# Patient Record
Sex: FEMALE | Race: WHITE | NOT HISPANIC OR LATINO | Employment: OTHER | URBAN - METROPOLITAN AREA
[De-identification: names, ages, dates, MRNs, and addresses within clinical notes are randomized per-mention and may not be internally consistent; named-entity substitution may affect disease eponyms.]

---

## 2017-01-12 ENCOUNTER — HOSPITAL ENCOUNTER (OUTPATIENT)
Dept: GENERAL RADIOLOGY | Facility: HOSPITAL | Age: 67
Discharge: HOME | End: 2017-01-12
Admitting: ORTHOPAEDIC SURGERY

## 2017-01-12 ENCOUNTER — APPOINTMENT (OUTPATIENT)
Dept: PREADMISSION TESTING | Facility: HOSPITAL | Age: 67
End: 2017-01-12

## 2017-01-12 ENCOUNTER — HOSPITAL ENCOUNTER (OUTPATIENT)
Dept: GENERAL RADIOLOGY | Facility: HOSPITAL | Age: 67
Discharge: HOME | End: 2017-01-12

## 2017-01-12 VITALS
WEIGHT: 178 LBS | HEIGHT: 65 IN | RESPIRATION RATE: 16 BRPM | DIASTOLIC BLOOD PRESSURE: 63 MMHG | HEART RATE: 76 BPM | SYSTOLIC BLOOD PRESSURE: 110 MMHG | BODY MASS INDEX: 29.66 KG/M2 | TEMPERATURE: 98.2 F | OXYGEN SATURATION: 97 %

## 2017-01-12 LAB
ANION GAP SERPL CALCULATED.3IONS-SCNC: 18.5 MMOL/L
BASOPHILS # BLD AUTO: 0.04 10*3/MM3 (ref 0–0.2)
BASOPHILS NFR BLD AUTO: 0.4 % (ref 0–1.5)
BILIRUB UR QL STRIP: NEGATIVE
BUN BLD-MCNC: 34 MG/DL (ref 8–23)
BUN/CREAT SERPL: 26.6 (ref 7–25)
CALCIUM SPEC-SCNC: 9 MG/DL (ref 8.6–10.5)
CHLORIDE SERPL-SCNC: 97 MMOL/L (ref 98–107)
CLARITY UR: CLEAR
CO2 SERPL-SCNC: 23.5 MMOL/L (ref 22–29)
COLOR UR: YELLOW
CREAT BLD-MCNC: 1.28 MG/DL (ref 0.57–1)
DEPRECATED RDW RBC AUTO: 46.2 FL (ref 37–54)
EOSINOPHIL # BLD AUTO: 0.14 10*3/MM3 (ref 0–0.7)
EOSINOPHIL NFR BLD AUTO: 1.5 % (ref 0.3–6.2)
ERYTHROCYTE [DISTWIDTH] IN BLOOD BY AUTOMATED COUNT: 15 % (ref 11.7–13)
GFR SERPL CREATININE-BSD FRML MDRD: 42 ML/MIN/1.73
GLUCOSE BLD-MCNC: 252 MG/DL (ref 65–99)
GLUCOSE UR STRIP-MCNC: NEGATIVE MG/DL
HCT VFR BLD AUTO: 37.5 % (ref 35.6–45.5)
HGB BLD-MCNC: 11.5 G/DL (ref 11.9–15.5)
HGB UR QL STRIP.AUTO: NEGATIVE
IMM GRANULOCYTES # BLD: 0 10*3/MM3 (ref 0–0.03)
IMM GRANULOCYTES NFR BLD: 0 % (ref 0–0.5)
KETONES UR QL STRIP: NEGATIVE
LEUKOCYTE ESTERASE UR QL STRIP.AUTO: NEGATIVE
LYMPHOCYTES # BLD AUTO: 2.59 10*3/MM3 (ref 0.9–4.8)
LYMPHOCYTES NFR BLD AUTO: 27.8 % (ref 19.6–45.3)
MCH RBC QN AUTO: 25.7 PG (ref 26.9–32)
MCHC RBC AUTO-ENTMCNC: 30.7 G/DL (ref 32.4–36.3)
MCV RBC AUTO: 83.9 FL (ref 80.5–98.2)
MONOCYTES # BLD AUTO: 0.47 10*3/MM3 (ref 0.2–1.2)
MONOCYTES NFR BLD AUTO: 5 % (ref 5–12)
NEUTROPHILS # BLD AUTO: 6.09 10*3/MM3 (ref 1.9–8.1)
NEUTROPHILS NFR BLD AUTO: 65.3 % (ref 42.7–76)
NITRITE UR QL STRIP: NEGATIVE
PH UR STRIP.AUTO: 5.5 [PH] (ref 5–8)
PLATELET # BLD AUTO: 355 10*3/MM3 (ref 140–500)
PMV BLD AUTO: 10.4 FL (ref 6–12)
POTASSIUM BLD-SCNC: 3.9 MMOL/L (ref 3.5–5.2)
PROT UR QL STRIP: NEGATIVE
RBC # BLD AUTO: 4.47 10*6/MM3 (ref 3.9–5.2)
SODIUM BLD-SCNC: 139 MMOL/L (ref 136–145)
SP GR UR STRIP: 1.01 (ref 1–1.03)
UROBILINOGEN UR QL STRIP: NORMAL
WBC NRBC COR # BLD: 9.33 10*3/MM3 (ref 4.5–10.7)

## 2017-01-12 PROCEDURE — 81003 URINALYSIS AUTO W/O SCOPE: CPT | Performed by: ORTHOPAEDIC SURGERY

## 2017-01-12 PROCEDURE — 93010 ELECTROCARDIOGRAM REPORT: CPT | Performed by: INTERNAL MEDICINE

## 2017-01-12 PROCEDURE — 73502 X-RAY EXAM HIP UNI 2-3 VIEWS: CPT

## 2017-01-12 PROCEDURE — 80048 BASIC METABOLIC PNL TOTAL CA: CPT | Performed by: ORTHOPAEDIC SURGERY

## 2017-01-12 PROCEDURE — 71020 HC CHEST PA AND LATERAL: CPT

## 2017-01-12 PROCEDURE — 87086 URINE CULTURE/COLONY COUNT: CPT | Performed by: ORTHOPAEDIC SURGERY

## 2017-01-12 PROCEDURE — 85025 COMPLETE CBC W/AUTO DIFF WBC: CPT | Performed by: ORTHOPAEDIC SURGERY

## 2017-01-12 PROCEDURE — 93005 ELECTROCARDIOGRAM TRACING: CPT

## 2017-01-12 PROCEDURE — 36415 COLL VENOUS BLD VENIPUNCTURE: CPT

## 2017-01-12 RX ORDER — ASPIRIN 81 MG/1
81 TABLET, CHEWABLE ORAL DAILY
Status: ON HOLD | COMMUNITY
End: 2017-03-23

## 2017-01-12 RX ORDER — DOXYCYCLINE HYCLATE 100 MG/1
100 CAPSULE ORAL 2 TIMES DAILY
COMMUNITY
Start: 2017-01-06 | End: 2017-01-16

## 2017-01-12 RX ORDER — MAGNESIUM OXIDE 400 MG/1
400 TABLET ORAL DAILY
COMMUNITY

## 2017-01-12 RX ORDER — METOPROLOL SUCCINATE 50 MG/1
50 TABLET, EXTENDED RELEASE ORAL EVERY MORNING
COMMUNITY

## 2017-01-12 RX ORDER — ACETAMINOPHEN 500 MG
1000 TABLET ORAL EVERY 6 HOURS PRN
COMMUNITY

## 2017-01-12 NOTE — DISCHARGE INSTRUCTIONS
Take the following medications the morning of surgery with a small sip of water.     METOPROLOL    General Instructions:  • Do not eat or drink after midnight: includes water, mints, or gum. You may brush your teeth.  • Do not smoke, chew tobacco, or drink alcohol.  • Bring medications in original bottles, any inhalers and if applicable your C-PAP/ BI-PAP machine.  • Bring any papers given to you in the doctor’s office.  • Wear clean comfortable clothes and socks.  • Do not wear contact lenses or make-up.  Bring a case for your glasses if applicable.   • Bring crutches or walker if applicable.  • Leave all other valuables and jewelry at home.      Preventing a Surgical Site Infection:  Shower on the morning of surgery using a fresh bar of anti-bacterial soap (such as Dial) and clean washcloth.  Dry with a clean towel and dress in clean clothing.  For 2 to 3 days before surgery, avoid shaving with a razor near where you will have surgery because the razor can irritate skin and make it easier to develop an infection  Ask your surgeon if you will be receiving antibiotics prior to surgery  Make sure you, your family, and all healthcare providers clean their hands with soap and water or an alcohol based hand  before caring for you or your wound  If at all possible, quit smoking as many days before surgery as you can.    Day of surgery:01- ARRIVE @ 0700 AM REPORT TO THE MAIN OR   Upon arrival, a Pre-op nurse and Anesthesiologist will review your health history, obtain vital signs, and answer questions you may have.  The only belongings needed at this time will be your home medications and if applicable your C-PAP/BI-PAP machine.  If you are staying overnight your family can leave the rest of your belongings in the car and bring them to your room later.  A Pre-op nurse will start an IV and you may receive medication in preparation for surgery, including something to help you relax.  Your family will be able  to see you in the Pre-op area.  While you are in surgery your family should notify the waiting room  if they leave the waiting room area and provide a contact phone number.    Please be aware that surgery does come with discomfort.  We want to make every effort to control your discomfort so please discuss any uncontrolled symptoms with your nurse.   Your doctor will most likely have prescribed pain medications.      If you are going home after surgery you will receive individualized written care instructions before being discharged.  A responsible adult must drive you to and from the hospital on the day of your surgery and stay with you for 24 hours.    If you are staying overnight following surgery, you will be transported to your hospital room following the recovery period.  [facility] has all private rooms.    If you have any questions please call Pre-Admission Testing at [phone].  Deductibles and co-payments are collected on the day of service. Please be prepared to pay the required co-pay, deductible or deposit on the day of service as defined by your plan.    2% CHLORAHEXIDINE GLUCONATE* CLOTH  Preparing or “prepping” skin before surgery can reduce the risk of infection at the surgical site. To make the process easier, [facility] has chosen disposable cloths moistened with a rinse-free, 2% Chlorhexidine Gluconate (CHG) antiseptic solution. The steps below outline the prepping process and should be carefully followed.        Use the prep cloth on the area that is circled in the diagram             Directions Night before Surgery 01- PM PRIOR TO SURGERY (RIGHT HIP AREA)  1) Shower using a fresh bar of anti-bacterial soap (such as Dial) and clean washcloth.  Use a clean towel to completely dry your skin.  2) Do not use any lotions, oils or creams on your skin.  3) Open the package and remove 1 cloth, wipe your skin for 30 seconds in a circular motion.  Allow to  dry for 3 minutes.  4) Repeat #3 with second cloth.  5) Do not touch your eyes, ears, or mouth with the prep cloth.  6) Allow the wet prep solution to air dry.  7) Discard the prep cloth and wash your hands with soap and water.   8) Dress in clean bed clothes and sleep on fresh clean bed sheets.   9) You may experience some temporary itching after the prep.    Directions Day of Surgery 01- AM PRIOR TO SURGERY (RIGHT HIP AREA)  1) Repeat steps 1,2,3,4,5,6,7, and 9.   2) Dress in clean clothes before coming to the hospital.    BACTROBAN NASAL OINTMENT  There are many germs normally in your nose. Bactroban is an ointment that will help reduce these germs. Please follow these instructions for Bactroban use:    ____The day before surgery in the morning  [id number]______    ____The day before surgery in the evening              [id number]_______-    ____The day of surgery in the morning    [id number]_______    **Squirt ½ package of Bactroban Ointment onto a cotton applicator and apply to inside of 1st nostril.  Squirt the remaining Bactroban and apply to the inside of the other nostril.

## 2017-01-12 NOTE — MR AVS SNAPSHOT
"                        Monie Mccoy   1/12/2017 2:00 PM   Appointment    Provider:  BUTCH Calvillo   Department:  [facility] PREADMISSION T   Dept Phone:  [phone]                Your Full Care Plan              Your Updated Medication List          This list is accurate as of: 1/12/17  2:42 PM.  Always use your most recent med list.                acetaminophen 500 MG tablet   Commonly known as:  TYLENOL       aspirin 81 MG chewable tablet       CHLORHEXIDINE GLUCONATE CLOTH EX       diclofenac 75 MG EC tablet   Commonly known as:  VOLTAREN       doxycycline 100 MG capsule   Commonly known as:  VIBRAMYCIN       hydrochlorothiazide 25 MG tablet   Commonly known as:  HYDRODIURIL       * LEVEMIR 100 UNIT/ML injection   Generic drug:  insulin detemir       * LEVEMIR 100 UNIT/ML injection   Generic drug:  insulin detemir       levocetirizine 5 MG tablet   Commonly known as:  XYZAL       lisinopril 40 MG tablet   Commonly known as:  PRINIVIL,ZESTRIL       magnesium oxide 400 MG tablet   Commonly known as:  MAG-OX       metFORMIN 500 MG tablet   Commonly known as:  GLUCOPHAGE       metoprolol succinate XL 50 MG 24 hr tablet   Commonly known as:  TOPROL-XL       montelukast 10 MG tablet   Commonly known as:  SINGULAIR       mupirocin 2 % nasal ointment   Commonly known as:  BACTROBAN       potassium chloride 10 MEQ CR tablet   Commonly known as:  K-DUR       RELION INSULIN SYR 0.5CC/29G 29G X 1/2\" 0.5 ML misc   Generic drug:  Insulin Syringe       sertraline 100 MG tablet   Commonly known as:  ZOLOFT       simvastatin 40 MG tablet   Commonly known as:  ZOCOR       traMADol 50 MG tablet   Commonly known as:  ULTRAM   Take 1 tablet by mouth At Night As Needed for severe pain (7-10).       * Notice:  This list has 2 medication(s) that are the same as other medications prescribed for you. Read the directions carefully, and ask your doctor or other care provider to review them with you.            Geovanna " "Signup     [facility] [employer] allows you to send messages to your doctor, view your test results, renew your prescriptions, schedule appointments, and more. To sign up, go to [employer] and click on the Sign Up Now link in the New User? box. Enter your [employer] Activation Code exactly as it appears below along with the last four digits of your Social Security Number and your Date of Birth () to complete the sign-up process. If you do not sign up before the expiration date, you must request a new code.    [employer] Activation Code: [email or web address]  Expires: 2017  2:42 PM    If you have questions, you can email [email or web address] or call [phone] to talk to our [employer] staff. Remember, [employer] is NOT to be used for urgent needs. For medical emergencies, dial 911.               Other Info from Your Visit           Allergies     Amoxicillin Itching, Rash    Furadantin [Nitrofurantoin] Hives, Itching, Rash      Vital Signs     Blood Pressure Pulse Temperature Respirations Height Weight    110/63 (BP Location: Left arm, Patient Position: Sitting) 76 98.2 °F (36.8 °C) (Oral) 16 64.5\" (163.8 cm) 178 lb (80.7 kg)    Oxygen Saturation Body Mass Index Smoking Status             97% 30.08 kg/m2 Former Smoker           Discharge Instructions       Take the following medications the morning of surgery with a small sip of water.     METOPROLOL    General Instructions:  • Do not eat or drink after midnight: includes water, mints, or gum. You may brush your teeth.  • Do not smoke, chew tobacco, or drink alcohol.  • Bring medications in original bottles, any inhalers and if applicable your C-PAP/ BI-PAP machine.  • Bring any papers given to you in the doctor’s office.  • Wear clean comfortable clothes and socks.  • Do not wear contact lenses or make-up.  Bring a case for your glasses if applicable.   • Bring crutches or walker if applicable.  • Leave all other valuables and jewelry at " home.      Preventing a Surgical Site Infection:  Shower on the morning of surgery using a fresh bar of anti-bacterial soap (such as Dial) and clean washcloth.  Dry with a clean towel and dress in clean clothing.  For 2 to 3 days before surgery, avoid shaving with a razor near where you will have surgery because the razor can irritate skin and make it easier to develop an infection  Ask your surgeon if you will be receiving antibiotics prior to surgery  Make sure you, your family, and all healthcare providers clean their hands with soap and water or an alcohol based hand  before caring for you or your wound  If at all possible, quit smoking as many days before surgery as you can.    Day of surgery:01- ARRIVE @ 0700 AM REPORT TO THE MAIN OR   Upon arrival, a Pre-op nurse and Anesthesiologist will review your health history, obtain vital signs, and answer questions you may have.  The only belongings needed at this time will be your home medications and if applicable your C-PAP/BI-PAP machine.  If you are staying overnight your family can leave the rest of your belongings in the car and bring them to your room later.  A Pre-op nurse will start an IV and you may receive medication in preparation for surgery, including something to help you relax.  Your family will be able to see you in the Pre-op area.  While you are in surgery your family should notify the waiting room  if they leave the waiting room area and provide a contact phone number.    Please be aware that surgery does come with discomfort.  We want to make every effort to control your discomfort so please discuss any uncontrolled symptoms with your nurse.   Your doctor will most likely have prescribed pain medications.      If you are going home after surgery you will receive individualized written care instructions before being discharged.  A responsible adult must drive you to and from the hospital on the day of your surgery and  stay with you for 24 hours.    If you are staying overnight following surgery, you will be transported to your hospital room following the recovery period.  [facility] has all private rooms.    If you have any questions please call Pre-Admission Testing at [phone].  Deductibles and co-payments are collected on the day of service. Please be prepared to pay the required co-pay, deductible or deposit on the day of service as defined by your plan.    2% CHLORAHEXIDINE GLUCONATE* CLOTH  Preparing or “prepping” skin before surgery can reduce the risk of infection at the surgical site. To make the process easier, [facility] has chosen disposable cloths moistened with a rinse-free, 2% Chlorhexidine Gluconate (CHG) antiseptic solution. The steps below outline the prepping process and should be carefully followed.        Use the prep cloth on the area that is circled in the diagram             Directions Night before Surgery 01- PM PRIOR TO SURGERY (RIGHT HIP AREA)  1) Shower using a fresh bar of anti-bacterial soap (such as Dial) and clean washcloth.  Use a clean towel to completely dry your skin.  2) Do not use any lotions, oils or creams on your skin.  3) Open the package and remove 1 cloth, wipe your skin for 30 seconds in a circular motion.  Allow to dry for 3 minutes.  4) Repeat #3 with second cloth.  5) Do not touch your eyes, ears, or mouth with the prep cloth.  6) Allow the wet prep solution to air dry.  7) Discard the prep cloth and wash your hands with soap and water.   8) Dress in clean bed clothes and sleep on fresh clean bed sheets.   9) You may experience some temporary itching after the prep.    Directions Day of Surgery 01- AM PRIOR TO SURGERY (RIGHT HIP AREA)  1) Repeat steps 1,2,3,4,5,6,7, and 9.   2) Dress in clean clothes before coming to the hospital.    BACTROBAN NASAL OINTMENT  There are many germs normally in your nose. Bactroban is an ointment that will  help reduce these germs. Please follow these instructions for Bactroban use:    ____The day before surgery in the morning  [id number]______    ____The day before surgery in the evening              [id number]_______-    ____The day of surgery in the morning    [id number]_______    **Squirt ½ package of Bactroban Ointment onto a cotton applicator and apply to inside of 1st nostril.  Squirt the remaining Bactroban and apply to the inside of the other nostril.         SYMPTOMS OF A STROKE    Call 911 or have someone take you to the Emergency Department if you have any of the following:    · Sudden numbness or weakness of your face, arm or leg especially on one side of the body  · Sudden confusion, diffiiculty speaking or trouble understanding   · Changes in your vision or loss of sight in one eye  · Sudden severe headache with no known cause  · sudden dizziness, trouble walking, loss of balance or coordination    It is important to seek emergency care right away if you have further stroke symptoms. If you get emergency help quickly, the powerful clot-dissolving medicines can reduce the disabilities caused by a stroke.     For more information:    American Stroke Association  7-002-8-STROKE  www.strokeassociation.org           IF YOU SMOKE OR USE TOBACCO PLEASE READ THE FOLLOWING:    Why is smoking bad for me?  Smoking increases the risk of heart disease, lung disease, vascular disease, stroke, and cancer.     If you smoke, STOP!    If you would like more information on quitting smoking, please visit the [employer] website: [email or web address]   This link will provide additional resources including the QUIT line and the Beat the Pack support groups.     For more information:    American Cancer Society  ([phone] 380-9591    American Heart Association  1-316.161.7140

## 2017-01-14 LAB — BACTERIA SPEC AEROBE CULT: NORMAL

## 2017-01-23 ENCOUNTER — TELEPHONE (OUTPATIENT)
Dept: ORTHOPEDIC SURGERY | Facility: CLINIC | Age: 67
End: 2017-01-23

## 2017-01-23 ENCOUNTER — PREP FOR SURGERY (OUTPATIENT)
Dept: ORTHOPEDIC SURGERY | Facility: CLINIC | Age: 67
End: 2017-01-23

## 2017-01-23 DIAGNOSIS — M16.11 OSTEOARTHRITIS OF ONE HIP, RIGHT: Primary | ICD-10-CM

## 2017-03-08 ENCOUNTER — TELEPHONE (OUTPATIENT)
Dept: ORTHOPEDIC SURGERY | Facility: CLINIC | Age: 67
End: 2017-03-08

## 2017-03-08 NOTE — PROGRESS NOTES
Pre-Operative Orthopedic Assessment      Patient: Monie Mccoy    YOB: 1950      Age/Gender: 67 y.o. female  Medical Record Number: [id number]  Surgical Procedure Planned: TOTAL HIP ARTHROPLASTY ANTERIOR WITH HANA TABLE     Surgeon: Lalit Birch MD    Date of Surgery Planned: 03/21/2017    Question Value Score    Patient Score   1. What is your age group? 50-65 years  66-75 years  >75 years =2  =1  =0 1   2. Gender? Male  Female =2  =1 1   3. How far on average can you walk? (a block is 200 meters) Two blocks or more (+\- rest)  1-2 blocks (+\- rest)  Housebound (most of time) =2  =1  =0 0   4. Which gait aid to you use? (more often than not) None  Single-Point Stick  Crutches/Frame =2  =1  =0 0   5. Do you use community  supports? (home help, meals on wheels, district nursing) None or one per week  Two or more per week =1  =0 1   6. Will you live with someone who can care for you after your operation? Yes  No =3  =0 3      Your Score (out of 12)  6     Key Destination at Discharge from acute care predicted by score   Scores < 6  -- extended inpatient rehabilitation   Scores 6-9 -- additional intervention to discharge directly home (Rehabilitation in home)   Scores > 9 -- directly home         Discharge Disposition/Planning:     Patient Response   Discussed the Predicted discharge disposition needed based on RAPT Assessment with the patient.    yes   Patient selected discharge disposition:   home   Out Patient Rehabilitation Facility of Choice:    none   [facility]   Has the patient completed or been scheduled to complete pre-operative testing? yes   Post-Operative Care Giver Name and Phone Number:    Spouse Gareth  [phone]  Daughter  [phone]     Subacute Inpatient Rehabilitation:  Complete this section only if planning inpatient services at a Subacute Facility     Patient Response   Subacute Facility Preferred (Please list 2  facilities:      Requires pre-certification for inpatient rehabilitation services?         Planned source of transportation to inpatient rehabilitation facility?       If choosing inpatient services at an Acute or Subacute Facility please list a subsequent back-up plan (in case patient fails to qualify for inpatient rehabilitation). Back-up plans should include caregiver (family member or friend) for first 24-48 post- -operatively.       Home Equipment Patient Response   Does patient have a walker for home use?    yes   Does patient have a 3 in 1 commode for home use?    no   Does patient have a shower chair for home use?    no   Does patient have an elevated commode seat for home use? Handicapped commode   Does patient have a cane for home use?    yes   Is there any other medical equipment in the home? If so,  List in comment section below no   Pre-Operative Class Attendance Patient Response   Attended or scheduled to attend the pre-operative class within 1 year of total joint replacement? no   Not planning to attend the pre-operative class    no   Has attended the pre-operative class > 1 year ago    States she has been in the past but last knee replacement was at [facility]     Does not want to attend the class    no   Was misinformed that did not need to attend the class    Was not aware   Class time is not convenient    n/a   Other reasons for refusing to attend the pre-operative class (if yes, see comments below)   n/a   Patient Education  Completed   Expected time of discharge discussed yes   Encouraged to attend Pre-Operative Class    yes   Education re: Back-up plan for patients planning discharge to subacute facilities n/a   Education re: Predicted Discharge Disposition based on RAPT score    yes   Patient receptive and voiced understanding of information given    yes                                                                                                            Comments:    Patient resides in a  single story home with no steps.  Daughter resides nearby.  Patient was under the impression that she would stay several days at [facility] for rehab like her knee surgery at [facility] followed by skilled care at [facility].  Explained that [facility] does not have skilled care and we could assist with placement at subacute level of care or try for bed at [facility] and patient states that she wants to go home with Raquel PEÑA.                                       Signature: Miguelina Tejada RN    Date:  3/8/2017

## 2017-03-14 ENCOUNTER — APPOINTMENT (OUTPATIENT)
Dept: PREADMISSION TESTING | Facility: HOSPITAL | Age: 67
End: 2017-03-14

## 2017-03-14 VITALS
WEIGHT: 181 LBS | DIASTOLIC BLOOD PRESSURE: 70 MMHG | RESPIRATION RATE: 20 BRPM | SYSTOLIC BLOOD PRESSURE: 147 MMHG | OXYGEN SATURATION: 99 % | HEIGHT: 66 IN | BODY MASS INDEX: 29.09 KG/M2 | HEART RATE: 68 BPM | TEMPERATURE: 98.2 F

## 2017-03-14 LAB
ANION GAP SERPL CALCULATED.3IONS-SCNC: 13.2 MMOL/L
BACTERIA UR QL AUTO: NORMAL /HPF
BASOPHILS # BLD AUTO: 0.02 10*3/MM3 (ref 0–0.2)
BASOPHILS NFR BLD AUTO: 0.2 % (ref 0–1.5)
BILIRUB UR QL STRIP: NEGATIVE
BUN BLD-MCNC: 31 MG/DL (ref 8–23)
BUN/CREAT SERPL: 24.8 (ref 7–25)
CALCIUM SPEC-SCNC: 9.2 MG/DL (ref 8.6–10.5)
CHLORIDE SERPL-SCNC: 100 MMOL/L (ref 98–107)
CLARITY UR: CLEAR
CO2 SERPL-SCNC: 25.8 MMOL/L (ref 22–29)
COLOR UR: YELLOW
CREAT BLD-MCNC: 1.25 MG/DL (ref 0.57–1)
DEPRECATED RDW RBC AUTO: 46.9 FL (ref 37–54)
EOSINOPHIL # BLD AUTO: 0.1 10*3/MM3 (ref 0–0.7)
EOSINOPHIL NFR BLD AUTO: 1.1 % (ref 0.3–6.2)
ERYTHROCYTE [DISTWIDTH] IN BLOOD BY AUTOMATED COUNT: 15.6 % (ref 11.7–13)
GFR SERPL CREATININE-BSD FRML MDRD: 43 ML/MIN/1.73
GLUCOSE BLD-MCNC: 137 MG/DL (ref 65–99)
GLUCOSE UR STRIP-MCNC: NEGATIVE MG/DL
HCT VFR BLD AUTO: 36.9 % (ref 35.6–45.5)
HGB BLD-MCNC: 11.3 G/DL (ref 11.9–15.5)
HGB UR QL STRIP.AUTO: NEGATIVE
HYALINE CASTS UR QL AUTO: NORMAL /LPF
IMM GRANULOCYTES # BLD: 0.02 10*3/MM3 (ref 0–0.03)
IMM GRANULOCYTES NFR BLD: 0.2 % (ref 0–0.5)
KETONES UR QL STRIP: NEGATIVE
LEUKOCYTE ESTERASE UR QL STRIP.AUTO: NEGATIVE
LYMPHOCYTES # BLD AUTO: 3.14 10*3/MM3 (ref 0.9–4.8)
LYMPHOCYTES NFR BLD AUTO: 34 % (ref 19.6–45.3)
MCH RBC QN AUTO: 25.2 PG (ref 26.9–32)
MCHC RBC AUTO-ENTMCNC: 30.6 G/DL (ref 32.4–36.3)
MCV RBC AUTO: 82.2 FL (ref 80.5–98.2)
MONOCYTES # BLD AUTO: 0.37 10*3/MM3 (ref 0.2–1.2)
MONOCYTES NFR BLD AUTO: 4 % (ref 5–12)
NEUTROPHILS # BLD AUTO: 5.59 10*3/MM3 (ref 1.9–8.1)
NEUTROPHILS NFR BLD AUTO: 60.5 % (ref 42.7–76)
NITRITE UR QL STRIP: NEGATIVE
PH UR STRIP.AUTO: 6 [PH] (ref 5–8)
PLATELET # BLD AUTO: 310 10*3/MM3 (ref 140–500)
PMV BLD AUTO: 9.9 FL (ref 6–12)
POTASSIUM BLD-SCNC: 4.1 MMOL/L (ref 3.5–5.2)
PROT UR QL STRIP: NEGATIVE
RBC # BLD AUTO: 4.49 10*6/MM3 (ref 3.9–5.2)
RBC # UR: NORMAL /HPF
REF LAB TEST METHOD: NORMAL
SODIUM BLD-SCNC: 139 MMOL/L (ref 136–145)
SP GR UR STRIP: 1.01 (ref 1–1.03)
SQUAMOUS #/AREA URNS HPF: NORMAL /HPF
UROBILINOGEN UR QL STRIP: NORMAL
WBC NRBC COR # BLD: 9.24 10*3/MM3 (ref 4.5–10.7)
WBC UR QL AUTO: NORMAL /HPF

## 2017-03-14 PROCEDURE — 36415 COLL VENOUS BLD VENIPUNCTURE: CPT

## 2017-03-14 PROCEDURE — A9270 NON-COVERED ITEM OR SERVICE: HCPCS | Performed by: ORTHOPAEDIC SURGERY

## 2017-03-14 PROCEDURE — 87086 URINE CULTURE/COLONY COUNT: CPT | Performed by: ORTHOPAEDIC SURGERY

## 2017-03-14 PROCEDURE — 80048 BASIC METABOLIC PNL TOTAL CA: CPT | Performed by: ORTHOPAEDIC SURGERY

## 2017-03-14 PROCEDURE — 85025 COMPLETE CBC W/AUTO DIFF WBC: CPT | Performed by: ORTHOPAEDIC SURGERY

## 2017-03-14 PROCEDURE — 63710000001 MUPIROCIN 2 % OINTMENT 0.9 G SYRINGE: Performed by: ORTHOPAEDIC SURGERY

## 2017-03-14 PROCEDURE — 81001 URINALYSIS AUTO W/SCOPE: CPT | Performed by: ORTHOPAEDIC SURGERY

## 2017-03-14 NOTE — DISCHARGE INSTRUCTIONS
Take the following medications the morning of surgery with a small sip of water.  METOPROLOL  BRING LISINOPRIL WITH YOU AM OF SURGERY      General Instructions:  • Do not eat or drink after midnight: includes water, mints, or gum. You may brush your teeth.  • Do not smoke, chew tobacco, or drink alcohol.  • The Pre-Admission Testing nurse will instruct you to bring medications if unable to obtain an accurate list in Pre-Admission Testing.    • If applicable bring your C-PAP/ BI-PAP machine.  • Bring any papers given to you in the doctor’s office.  • Wear clean comfortable clothes and socks.  • Do not wear contact lenses or make-up.  Bring a case for your glasses if applicable.   • Bring crutches or walker if applicable.  • Leave all other valuables and jewelry at home.    If you were given a blood bank ID arm band remember to bring it with you the day of surgery.    Preventing a Surgical Site Infection:  Shower on the morning of surgery using a fresh bar of anti-bacterial soap (such as Dial) and clean washcloth.  Dry with a clean towel and dress in clean clothing.  For 2 to 3 days before surgery, avoid shaving with a razor near where you will have surgery because the razor can irritate skin and make it easier to develop an infection  Ask your surgeon if you will be receiving antibiotics prior to surgery  Make sure you, your family, and all healthcare providers clean their hands with soap and water or an alcohol based hand  before caring for you or your wound  If at all possible, quit smoking as many days before surgery as you can.    Day of surgery: 3/21/2017 ARRIVAL TIME 7:00 AM  Upon arrival, a Pre-op nurse and Anesthesiologist will review your health history, obtain vital signs, and answer questions you may have.  The only belongings needed at this time will be your home medications and if applicable your C-PAP/BI-PAP machine.  If you are staying overnight your family can leave the rest of your  belongings in the car and bring them to your room later.  A Pre-op nurse will start an IV and you may receive medication in preparation for surgery, including something to help you relax.  Your family will be able to see you in the Pre-op area.  While you are in surgery your family should notify the waiting room  if they leave the waiting room area and provide a contact phone number.    Please be aware that surgery does come with discomfort.  We want to make every effort to control your discomfort so please discuss any uncontrolled symptoms with your nurse.   Your doctor will most likely have prescribed pain medications.      If you are going home after surgery you will receive individualized written care instructions before being discharged.  A responsible adult must drive you to and from the hospital on the day of your surgery and stay with you for 24 hours.    If you are staying overnight following surgery, you will be transported to your hospital room following the recovery period.  [facility] has all private rooms.    If you have any questions please call Pre-Admission Testing at [phone].  Deductibles and co-payments are collected on the day of service. Please be prepared to pay the required co-pay, deductible or deposit on the day of service as defined by your plan.    2% CHLORAHEXIDINE GLUCONATE* CLOTH  Preparing or “prepping” skin before surgery can reduce the risk of infection at the surgical site. To make the process easier, [facility] has chosen disposable cloths moistened with a rinse-free, 2% Chlorhexidine Gluconate (CHG) antiseptic solution. The steps below outline the prepping process and should be carefully followed.        Use the prep cloth on the area that is circled in the diagram             Directions Night before Surgery  1) Shower using a fresh bar of anti-bacterial soap (such as Dial) and clean washcloth.  Use a clean towel to completely dry your  skin.  2) Do not use any lotions, oils or creams on your skin.  3) Open the package and remove 1 cloth, wipe your skin for 30 seconds in a circular motion.  Allow to dry for 3 minutes.  4) Repeat #3 with second cloth.  5) Do not touch your eyes, ears, or mouth with the prep cloth.  6) Allow the wet prep solution to air dry.  7) Discard the prep cloth and wash your hands with soap and water.   8) Dress in clean bed clothes and sleep on fresh clean bed sheets.   9) You may experience some temporary itching after the prep.    Directions Day of Surgery  1) Repeat steps 1,2,3,4,5,6,7, and 9.   2) Dress in clean clothes before coming to the hospital.    BACTROBAN NASAL OINTMENT  There are many germs normally in your nose. Bactroban is an ointment that will help reduce these germs. Please follow these instructions for Bactroban use:        ____The day before surgery in the morning  Date________    ____The day before surgery in the evening              Date________    ____The day of surgery in the morning    Date________    **Squirt ½ package of Bactroban Ointment onto a cotton applicator and apply to inside of 1st nostril.  Squirt the remaining Bactroban and apply to the inside of the other nostril.

## 2017-03-16 LAB — BACTERIA SPEC AEROBE CULT: NO GROWTH

## 2017-03-21 ENCOUNTER — HOSPITAL ENCOUNTER (INPATIENT)
Facility: HOSPITAL | Age: 67
LOS: 2 days | Discharge: HOME HEALTH | End: 2017-03-23
Attending: ORTHOPAEDIC SURGERY | Admitting: ORTHOPAEDIC SURGERY

## 2017-03-21 ENCOUNTER — ANESTHESIA (OUTPATIENT)
Dept: PERIOP | Facility: HOSPITAL | Age: 67
End: 2017-03-21

## 2017-03-21 ENCOUNTER — APPOINTMENT (OUTPATIENT)
Dept: GENERAL RADIOLOGY | Facility: HOSPITAL | Age: 67
End: 2017-03-21

## 2017-03-21 ENCOUNTER — ANESTHESIA EVENT (OUTPATIENT)
Dept: PERIOP | Facility: HOSPITAL | Age: 67
End: 2017-03-21

## 2017-03-21 DIAGNOSIS — M16.11 OSTEOARTHRITIS OF ONE HIP, RIGHT: ICD-10-CM

## 2017-03-21 DIAGNOSIS — M16.11 PRIMARY OSTEOARTHRITIS OF RIGHT HIP: Primary | ICD-10-CM

## 2017-03-21 LAB
GLUCOSE BLDC GLUCOMTR-MCNC: 112 MG/DL (ref 70–130)
GLUCOSE BLDC GLUCOMTR-MCNC: 139 MG/DL (ref 70–130)
GLUCOSE BLDC GLUCOMTR-MCNC: 268 MG/DL (ref 70–130)
GLUCOSE BLDC GLUCOMTR-MCNC: 46 MG/DL (ref 70–130)
GLUCOSE BLDC GLUCOMTR-MCNC: 87 MG/DL (ref 70–130)
INR PPP: 1.11 (ref 0.9–1.1)
PROTHROMBIN TIME: 13.9 SECONDS (ref 11.7–14.2)

## 2017-03-21 PROCEDURE — 25010000002 FENTANYL CITRATE (PF) 100 MCG/2ML SOLUTION: Performed by: ANESTHESIOLOGY

## 2017-03-21 PROCEDURE — 99221 1ST HOSP IP/OBS SF/LOW 40: CPT | Performed by: INTERNAL MEDICINE

## 2017-03-21 PROCEDURE — 25010000002 HYDROMORPHONE PER 4 MG: Performed by: ORTHOPAEDIC SURGERY

## 2017-03-21 PROCEDURE — 63710000001 INSULIN DETEMER PER 5 UNITS: Performed by: ORTHOPAEDIC SURGERY

## 2017-03-21 PROCEDURE — 63710000001 INSULIN ASPART PER 5 UNITS: Performed by: INTERNAL MEDICINE

## 2017-03-21 PROCEDURE — 97110 THERAPEUTIC EXERCISES: CPT

## 2017-03-21 PROCEDURE — 25010000002 PROPOFOL 10 MG/ML EMULSION: Performed by: ANESTHESIOLOGY

## 2017-03-21 PROCEDURE — 97161 PT EVAL LOW COMPLEX 20 MIN: CPT

## 2017-03-21 PROCEDURE — 85610 PROTHROMBIN TIME: CPT | Performed by: ORTHOPAEDIC SURGERY

## 2017-03-21 PROCEDURE — 25010000002 ROPIVACAINE PER 1 MG: Performed by: ORTHOPAEDIC SURGERY

## 2017-03-21 PROCEDURE — 27130 TOTAL HIP ARTHROPLASTY: CPT | Performed by: ORTHOPAEDIC SURGERY

## 2017-03-21 PROCEDURE — 25010000002 ONDANSETRON PER 1 MG: Performed by: NURSE ANESTHETIST, CERTIFIED REGISTERED

## 2017-03-21 PROCEDURE — C1776 JOINT DEVICE (IMPLANTABLE): HCPCS | Performed by: ORTHOPAEDIC SURGERY

## 2017-03-21 PROCEDURE — 25010000002 MIDAZOLAM PER 1 MG: Performed by: ANESTHESIOLOGY

## 2017-03-21 PROCEDURE — S0260 H&P FOR SURGERY: HCPCS | Performed by: ORTHOPAEDIC SURGERY

## 2017-03-21 PROCEDURE — 25010000002 NEOSTIGMINE 5 MG/10ML SOLUTION: Performed by: NURSE ANESTHETIST, CERTIFIED REGISTERED

## 2017-03-21 PROCEDURE — 25010000002 FENTANYL CITRATE (PF) 100 MCG/2ML SOLUTION

## 2017-03-21 PROCEDURE — C1713 ANCHOR/SCREW BN/BN,TIS/BN: HCPCS | Performed by: ORTHOPAEDIC SURGERY

## 2017-03-21 PROCEDURE — 25010000002 HYDROMORPHONE PER 4 MG

## 2017-03-21 PROCEDURE — 82962 GLUCOSE BLOOD TEST: CPT

## 2017-03-21 PROCEDURE — 94799 UNLISTED PULMONARY SVC/PX: CPT

## 2017-03-21 PROCEDURE — 76000 FLUOROSCOPY <1 HR PHYS/QHP: CPT

## 2017-03-21 PROCEDURE — 73501 X-RAY EXAM HIP UNI 1 VIEW: CPT

## 2017-03-21 PROCEDURE — 72170 X-RAY EXAM OF PELVIS: CPT

## 2017-03-21 PROCEDURE — 0SR904Z REPLACEMENT OF RIGHT HIP JOINT WITH CERAMIC ON POLYETHYLENE SYNTHETIC SUBSTITUTE, OPEN APPROACH: ICD-10-PCS | Performed by: ORTHOPAEDIC SURGERY

## 2017-03-21 DEVICE — TOTL HIP VE ZIMMER: Type: IMPLANTABLE DEVICE | Site: HIP | Status: FUNCTIONAL

## 2017-03-21 DEVICE — SCRW ACET CORT TRILOGY S/TAP 6.5X25: Type: IMPLANTABLE DEVICE | Site: HIP | Status: FUNCTIONAL

## 2017-03-21 DEVICE — IMPLANTABLE DEVICE: Type: IMPLANTABLE DEVICE | Site: HIP | Status: FUNCTIONAL

## 2017-03-21 DEVICE — BIOLOX® DELTA, CERAMIC FEMORAL HEAD, S, Ø 32/-3.5, TAPER 12/14
Type: IMPLANTABLE DEVICE | Site: HIP | Status: FUNCTIONAL
Brand: BIOLOX® DELTA

## 2017-03-21 RX ORDER — POTASSIUM CHLORIDE 750 MG/1
10 CAPSULE, EXTENDED RELEASE ORAL 2 TIMES DAILY WITH MEALS
Status: DISCONTINUED | OUTPATIENT
Start: 2017-03-21 | End: 2017-03-23 | Stop reason: HOSPADM

## 2017-03-21 RX ORDER — PROMETHAZINE HYDROCHLORIDE 25 MG/1
25 TABLET ORAL ONCE AS NEEDED
Status: DISCONTINUED | OUTPATIENT
Start: 2017-03-21 | End: 2017-03-21 | Stop reason: HOSPADM

## 2017-03-21 RX ORDER — SODIUM CHLORIDE 0.9 % (FLUSH) 0.9 %
1-10 SYRINGE (ML) INJECTION AS NEEDED
Status: DISCONTINUED | OUTPATIENT
Start: 2017-03-21 | End: 2017-03-23 | Stop reason: HOSPADM

## 2017-03-21 RX ORDER — SODIUM CHLORIDE, SODIUM LACTATE, POTASSIUM CHLORIDE, CALCIUM CHLORIDE 600; 310; 30; 20 MG/100ML; MG/100ML; MG/100ML; MG/100ML
9 INJECTION, SOLUTION INTRAVENOUS CONTINUOUS
Status: DISCONTINUED | OUTPATIENT
Start: 2017-03-21 | End: 2017-03-23 | Stop reason: HOSPADM

## 2017-03-21 RX ORDER — PROMETHAZINE HYDROCHLORIDE 25 MG/ML
12.5 INJECTION, SOLUTION INTRAMUSCULAR; INTRAVENOUS ONCE AS NEEDED
Status: DISCONTINUED | OUTPATIENT
Start: 2017-03-21 | End: 2017-03-21 | Stop reason: HOSPADM

## 2017-03-21 RX ORDER — OXYCODONE HYDROCHLORIDE AND ACETAMINOPHEN 5; 325 MG/1; MG/1
2 TABLET ORAL EVERY 4 HOURS PRN
Status: DISCONTINUED | OUTPATIENT
Start: 2017-03-21 | End: 2017-03-22

## 2017-03-21 RX ORDER — ONDANSETRON 4 MG/1
4 TABLET, FILM COATED ORAL EVERY 6 HOURS PRN
Status: DISCONTINUED | OUTPATIENT
Start: 2017-03-21 | End: 2017-03-23 | Stop reason: HOSPADM

## 2017-03-21 RX ORDER — ROPIVACAINE HYDROCHLORIDE 5 MG/ML
INJECTION, SOLUTION EPIDURAL; INFILTRATION; PERINEURAL AS NEEDED
Status: DISCONTINUED | OUTPATIENT
Start: 2017-03-21 | End: 2017-03-21 | Stop reason: HOSPADM

## 2017-03-21 RX ORDER — PROMETHAZINE HYDROCHLORIDE 25 MG/1
12.5 TABLET ORAL ONCE AS NEEDED
Status: DISCONTINUED | OUTPATIENT
Start: 2017-03-21 | End: 2017-03-21 | Stop reason: HOSPADM

## 2017-03-21 RX ORDER — DIPHENHYDRAMINE HYDROCHLORIDE 50 MG/ML
12.5 INJECTION INTRAMUSCULAR; INTRAVENOUS
Status: DISCONTINUED | OUTPATIENT
Start: 2017-03-21 | End: 2017-03-21 | Stop reason: HOSPADM

## 2017-03-21 RX ORDER — NICOTINE POLACRILEX 4 MG
15 LOZENGE BUCCAL
Status: DISCONTINUED | OUTPATIENT
Start: 2017-03-21 | End: 2017-03-23 | Stop reason: HOSPADM

## 2017-03-21 RX ORDER — ONDANSETRON 2 MG/ML
4 INJECTION INTRAMUSCULAR; INTRAVENOUS ONCE AS NEEDED
Status: DISCONTINUED | OUTPATIENT
Start: 2017-03-21 | End: 2017-03-21 | Stop reason: HOSPADM

## 2017-03-21 RX ORDER — HYDROCODONE BITARTRATE AND ACETAMINOPHEN 7.5; 325 MG/1; MG/1
1 TABLET ORAL ONCE AS NEEDED
Status: DISCONTINUED | OUTPATIENT
Start: 2017-03-21 | End: 2017-03-21 | Stop reason: HOSPADM

## 2017-03-21 RX ORDER — ONDANSETRON 2 MG/ML
INJECTION INTRAMUSCULAR; INTRAVENOUS AS NEEDED
Status: DISCONTINUED | OUTPATIENT
Start: 2017-03-21 | End: 2017-03-21 | Stop reason: SURG

## 2017-03-21 RX ORDER — LIDOCAINE HYDROCHLORIDE 20 MG/ML
INJECTION, SOLUTION INFILTRATION; PERINEURAL AS NEEDED
Status: DISCONTINUED | OUTPATIENT
Start: 2017-03-21 | End: 2017-03-21 | Stop reason: SURG

## 2017-03-21 RX ORDER — CETIRIZINE HYDROCHLORIDE 10 MG/1
5 TABLET ORAL DAILY
Status: DISCONTINUED | OUTPATIENT
Start: 2017-03-21 | End: 2017-03-23 | Stop reason: HOSPADM

## 2017-03-21 RX ORDER — ACETAMINOPHEN 10 MG/ML
INJECTION, SOLUTION INTRAVENOUS AS NEEDED
Status: DISCONTINUED | OUTPATIENT
Start: 2017-03-21 | End: 2017-03-21 | Stop reason: SURG

## 2017-03-21 RX ORDER — MONTELUKAST SODIUM 10 MG/1
10 TABLET ORAL NIGHTLY
Status: DISCONTINUED | OUTPATIENT
Start: 2017-03-21 | End: 2017-03-23 | Stop reason: HOSPADM

## 2017-03-21 RX ORDER — FENTANYL CITRATE 50 UG/ML
50 INJECTION, SOLUTION INTRAMUSCULAR; INTRAVENOUS
Status: DISCONTINUED | OUTPATIENT
Start: 2017-03-21 | End: 2017-03-21 | Stop reason: HOSPADM

## 2017-03-21 RX ORDER — HYDRALAZINE HYDROCHLORIDE 20 MG/ML
5 INJECTION INTRAMUSCULAR; INTRAVENOUS
Status: DISCONTINUED | OUTPATIENT
Start: 2017-03-21 | End: 2017-03-21 | Stop reason: HOSPADM

## 2017-03-21 RX ORDER — SODIUM CHLORIDE 9 MG/ML
75 INJECTION, SOLUTION INTRAVENOUS CONTINUOUS
Status: DISCONTINUED | OUTPATIENT
Start: 2017-03-21 | End: 2017-03-23 | Stop reason: HOSPADM

## 2017-03-21 RX ORDER — DIPHENHYDRAMINE HCL 25 MG
25 CAPSULE ORAL EVERY 6 HOURS PRN
Status: DISCONTINUED | OUTPATIENT
Start: 2017-03-21 | End: 2017-03-23 | Stop reason: HOSPADM

## 2017-03-21 RX ORDER — LISINOPRIL 40 MG/1
40 TABLET ORAL NIGHTLY
Status: DISCONTINUED | OUTPATIENT
Start: 2017-03-21 | End: 2017-03-21

## 2017-03-21 RX ORDER — ONDANSETRON 2 MG/ML
4 INJECTION INTRAMUSCULAR; INTRAVENOUS EVERY 6 HOURS PRN
Status: DISCONTINUED | OUTPATIENT
Start: 2017-03-21 | End: 2017-03-23 | Stop reason: HOSPADM

## 2017-03-21 RX ORDER — FENTANYL CITRATE 50 UG/ML
INJECTION, SOLUTION INTRAMUSCULAR; INTRAVENOUS
Status: COMPLETED
Start: 2017-03-21 | End: 2017-03-21

## 2017-03-21 RX ORDER — OXYCODONE AND ACETAMINOPHEN 7.5; 325 MG/1; MG/1
1 TABLET ORAL ONCE AS NEEDED
Status: DISCONTINUED | OUTPATIENT
Start: 2017-03-21 | End: 2017-03-21 | Stop reason: HOSPADM

## 2017-03-21 RX ORDER — NEOSTIGMINE METHYLSULFATE 0.5 MG/ML
INJECTION, SOLUTION INTRAVENOUS AS NEEDED
Status: DISCONTINUED | OUTPATIENT
Start: 2017-03-21 | End: 2017-03-21 | Stop reason: SURG

## 2017-03-21 RX ORDER — DEXTROSE MONOHYDRATE 25 G/50ML
25 INJECTION, SOLUTION INTRAVENOUS
Status: DISCONTINUED | OUTPATIENT
Start: 2017-03-21 | End: 2017-03-23 | Stop reason: HOSPADM

## 2017-03-21 RX ORDER — CLINDAMYCIN PHOSPHATE 900 MG/50ML
900 INJECTION INTRAVENOUS EVERY 8 HOURS
Status: COMPLETED | OUTPATIENT
Start: 2017-03-21 | End: 2017-03-22

## 2017-03-21 RX ORDER — OXYCODONE HYDROCHLORIDE AND ACETAMINOPHEN 5; 325 MG/1; MG/1
1 TABLET ORAL EVERY 6 HOURS PRN
Status: DISCONTINUED | OUTPATIENT
Start: 2017-03-21 | End: 2017-03-22

## 2017-03-21 RX ORDER — DEXTROSE MONOHYDRATE 25 G/50ML
25 INJECTION, SOLUTION INTRAVENOUS ONCE
Status: COMPLETED | OUTPATIENT
Start: 2017-03-21 | End: 2017-03-21

## 2017-03-21 RX ORDER — MIDAZOLAM HYDROCHLORIDE 1 MG/ML
1 INJECTION INTRAMUSCULAR; INTRAVENOUS
Status: DISCONTINUED | OUTPATIENT
Start: 2017-03-21 | End: 2017-03-21 | Stop reason: HOSPADM

## 2017-03-21 RX ORDER — MAGNESIUM HYDROXIDE 1200 MG/15ML
LIQUID ORAL AS NEEDED
Status: DISCONTINUED | OUTPATIENT
Start: 2017-03-21 | End: 2017-03-21 | Stop reason: HOSPADM

## 2017-03-21 RX ORDER — LABETALOL HYDROCHLORIDE 5 MG/ML
5 INJECTION, SOLUTION INTRAVENOUS
Status: DISCONTINUED | OUTPATIENT
Start: 2017-03-21 | End: 2017-03-21 | Stop reason: HOSPADM

## 2017-03-21 RX ORDER — FLUMAZENIL 0.1 MG/ML
0.2 INJECTION INTRAVENOUS AS NEEDED
Status: DISCONTINUED | OUTPATIENT
Start: 2017-03-21 | End: 2017-03-21 | Stop reason: HOSPADM

## 2017-03-21 RX ORDER — HYDROMORPHONE HYDROCHLORIDE 1 MG/ML
0.5 INJECTION, SOLUTION INTRAMUSCULAR; INTRAVENOUS; SUBCUTANEOUS
Status: DISCONTINUED | OUTPATIENT
Start: 2017-03-21 | End: 2017-03-21 | Stop reason: HOSPADM

## 2017-03-21 RX ORDER — UREA 10 %
1 LOTION (ML) TOPICAL NIGHTLY PRN
Status: DISCONTINUED | OUTPATIENT
Start: 2017-03-21 | End: 2017-03-23 | Stop reason: HOSPADM

## 2017-03-21 RX ORDER — NALOXONE HCL 0.4 MG/ML
0.2 VIAL (ML) INJECTION AS NEEDED
Status: DISCONTINUED | OUTPATIENT
Start: 2017-03-21 | End: 2017-03-21 | Stop reason: HOSPADM

## 2017-03-21 RX ORDER — PROPOFOL 10 MG/ML
VIAL (ML) INTRAVENOUS AS NEEDED
Status: DISCONTINUED | OUTPATIENT
Start: 2017-03-21 | End: 2017-03-21 | Stop reason: SURG

## 2017-03-21 RX ORDER — ACETAMINOPHEN 325 MG/1
325 TABLET ORAL EVERY 4 HOURS PRN
Status: DISCONTINUED | OUTPATIENT
Start: 2017-03-21 | End: 2017-03-23 | Stop reason: HOSPADM

## 2017-03-21 RX ORDER — SODIUM CHLORIDE 0.9 % (FLUSH) 0.9 %
1-10 SYRINGE (ML) INJECTION AS NEEDED
Status: DISCONTINUED | OUTPATIENT
Start: 2017-03-21 | End: 2017-03-21 | Stop reason: HOSPADM

## 2017-03-21 RX ORDER — ONDANSETRON 4 MG/1
4 TABLET, ORALLY DISINTEGRATING ORAL EVERY 6 HOURS PRN
Status: DISCONTINUED | OUTPATIENT
Start: 2017-03-21 | End: 2017-03-23 | Stop reason: HOSPADM

## 2017-03-21 RX ORDER — DEXTROSE MONOHYDRATE 25 G/50ML
INJECTION, SOLUTION INTRAVENOUS
Status: COMPLETED
Start: 2017-03-21 | End: 2017-03-21

## 2017-03-21 RX ORDER — GLYCOPYRROLATE 0.2 MG/ML
INJECTION INTRAMUSCULAR; INTRAVENOUS AS NEEDED
Status: DISCONTINUED | OUTPATIENT
Start: 2017-03-21 | End: 2017-03-21 | Stop reason: SURG

## 2017-03-21 RX ORDER — FENTANYL CITRATE 50 UG/ML
INJECTION, SOLUTION INTRAMUSCULAR; INTRAVENOUS AS NEEDED
Status: DISCONTINUED | OUTPATIENT
Start: 2017-03-21 | End: 2017-03-21 | Stop reason: SURG

## 2017-03-21 RX ORDER — MIDAZOLAM HYDROCHLORIDE 1 MG/ML
2 INJECTION INTRAMUSCULAR; INTRAVENOUS
Status: DISCONTINUED | OUTPATIENT
Start: 2017-03-21 | End: 2017-03-21 | Stop reason: HOSPADM

## 2017-03-21 RX ORDER — METOPROLOL SUCCINATE 50 MG/1
50 TABLET, EXTENDED RELEASE ORAL EVERY MORNING
Status: DISCONTINUED | OUTPATIENT
Start: 2017-03-22 | End: 2017-03-21

## 2017-03-21 RX ORDER — ROCURONIUM BROMIDE 10 MG/ML
INJECTION, SOLUTION INTRAVENOUS AS NEEDED
Status: DISCONTINUED | OUTPATIENT
Start: 2017-03-21 | End: 2017-03-21 | Stop reason: SURG

## 2017-03-21 RX ORDER — HYDROCHLOROTHIAZIDE 25 MG/1
25 TABLET ORAL EVERY MORNING
Status: DISCONTINUED | OUTPATIENT
Start: 2017-03-22 | End: 2017-03-21

## 2017-03-21 RX ORDER — TRANEXAMIC ACID 100 MG/ML
INJECTION, SOLUTION INTRAVENOUS AS NEEDED
Status: DISCONTINUED | OUTPATIENT
Start: 2017-03-21 | End: 2017-03-21 | Stop reason: SURG

## 2017-03-21 RX ORDER — FAMOTIDINE 10 MG/ML
20 INJECTION, SOLUTION INTRAVENOUS ONCE
Status: COMPLETED | OUTPATIENT
Start: 2017-03-21 | End: 2017-03-21

## 2017-03-21 RX ORDER — PROMETHAZINE HYDROCHLORIDE 25 MG/1
25 SUPPOSITORY RECTAL ONCE AS NEEDED
Status: DISCONTINUED | OUTPATIENT
Start: 2017-03-21 | End: 2017-03-21 | Stop reason: HOSPADM

## 2017-03-21 RX ORDER — SENNA AND DOCUSATE SODIUM 50; 8.6 MG/1; MG/1
2 TABLET, FILM COATED ORAL 2 TIMES DAILY
Status: DISCONTINUED | OUTPATIENT
Start: 2017-03-21 | End: 2017-03-23 | Stop reason: HOSPADM

## 2017-03-21 RX ORDER — METOPROLOL SUCCINATE 25 MG/1
12.5 TABLET, EXTENDED RELEASE ORAL DAILY
Status: DISCONTINUED | OUTPATIENT
Start: 2017-03-22 | End: 2017-03-23

## 2017-03-21 RX ORDER — BISACODYL 10 MG
10 SUPPOSITORY, RECTAL RECTAL DAILY PRN
Status: DISCONTINUED | OUTPATIENT
Start: 2017-03-21 | End: 2017-03-23 | Stop reason: HOSPADM

## 2017-03-21 RX ADMIN — CLINDAMYCIN PHOSPHATE 900 MG: 150 INJECTION, SOLUTION, CONCENTRATE INTRAVENOUS at 08:57

## 2017-03-21 RX ADMIN — EPHEDRINE SULFATE 10 MG: 50 INJECTION INTRAMUSCULAR; INTRAVENOUS; SUBCUTANEOUS at 10:03

## 2017-03-21 RX ADMIN — INSULIN ASPART 4 UNITS: 100 INJECTION, SOLUTION INTRAVENOUS; SUBCUTANEOUS at 21:00

## 2017-03-21 RX ADMIN — OXYCODONE HYDROCHLORIDE AND ACETAMINOPHEN 2 TABLET: 5; 325 TABLET ORAL at 19:53

## 2017-03-21 RX ADMIN — SODIUM CHLORIDE 75 ML/HR: 9 INJECTION, SOLUTION INTRAVENOUS at 14:35

## 2017-03-21 RX ADMIN — FAMOTIDINE 20 MG: 10 INJECTION, SOLUTION INTRAVENOUS at 08:18

## 2017-03-21 RX ADMIN — FENTANYL CITRATE 50 MCG: 50 INJECTION, SOLUTION INTRAMUSCULAR; INTRAVENOUS at 10:00

## 2017-03-21 RX ADMIN — SODIUM CHLORIDE, POTASSIUM CHLORIDE, SODIUM LACTATE AND CALCIUM CHLORIDE 9 ML/HR: 600; 310; 30; 20 INJECTION, SOLUTION INTRAVENOUS at 08:18

## 2017-03-21 RX ADMIN — ROCURONIUM BROMIDE 50 MG: 10 INJECTION INTRAVENOUS at 08:49

## 2017-03-21 RX ADMIN — PROPOFOL 150 MG: 10 INJECTION, EMULSION INTRAVENOUS at 08:49

## 2017-03-21 RX ADMIN — DEXTROSE MONOHYDRATE 25 ML: 25 INJECTION, SOLUTION INTRAVENOUS at 07:06

## 2017-03-21 RX ADMIN — CLINDAMYCIN PHOSPHATE 900 MG: 18 INJECTION, SOLUTION INTRAMUSCULAR; INTRAVENOUS at 16:20

## 2017-03-21 RX ADMIN — SERTRALINE 50 MG: 50 TABLET, FILM COATED ORAL at 15:37

## 2017-03-21 RX ADMIN — OXYCODONE HYDROCHLORIDE AND ACETAMINOPHEN 2 TABLET: 5; 325 TABLET ORAL at 15:36

## 2017-03-21 RX ADMIN — EPHEDRINE SULFATE 10 MG: 50 INJECTION INTRAMUSCULAR; INTRAVENOUS; SUBCUTANEOUS at 10:34

## 2017-03-21 RX ADMIN — MONTELUKAST 10 MG: 10 TABLET, FILM COATED ORAL at 21:00

## 2017-03-21 RX ADMIN — MIDAZOLAM 1 MG: 1 INJECTION INTRAMUSCULAR; INTRAVENOUS at 08:18

## 2017-03-21 RX ADMIN — HYDROMORPHONE HYDROCHLORIDE 0.5 MG: 1 INJECTION, SOLUTION INTRAMUSCULAR; INTRAVENOUS; SUBCUTANEOUS at 11:35

## 2017-03-21 RX ADMIN — ONDANSETRON 4 MG: 2 INJECTION INTRAMUSCULAR; INTRAVENOUS at 10:40

## 2017-03-21 RX ADMIN — FENTANYL CITRATE 50 MCG: 50 INJECTION INTRAMUSCULAR; INTRAVENOUS at 13:05

## 2017-03-21 RX ADMIN — FENTANYL CITRATE 50 MCG: 50 INJECTION, SOLUTION INTRAMUSCULAR; INTRAVENOUS at 10:08

## 2017-03-21 RX ADMIN — HYDROMORPHONE HYDROCHLORIDE 1 MG: 1 INJECTION, SOLUTION INTRAMUSCULAR; INTRAVENOUS; SUBCUTANEOUS at 22:01

## 2017-03-21 RX ADMIN — INSULIN DETEMIR 10 UNITS: 100 INJECTION, SOLUTION SUBCUTANEOUS at 21:00

## 2017-03-21 RX ADMIN — FENTANYL CITRATE 50 MCG: 50 INJECTION, SOLUTION INTRAMUSCULAR; INTRAVENOUS at 08:49

## 2017-03-21 RX ADMIN — TRANEXAMIC ACID 1000 MG: 100 INJECTION, SOLUTION INTRAVENOUS at 09:25

## 2017-03-21 RX ADMIN — FENTANYL CITRATE 100 MCG: 50 INJECTION, SOLUTION INTRAMUSCULAR; INTRAVENOUS at 09:29

## 2017-03-21 RX ADMIN — SODIUM CHLORIDE, POTASSIUM CHLORIDE, SODIUM LACTATE AND CALCIUM CHLORIDE: 600; 310; 30; 20 INJECTION, SOLUTION INTRAVENOUS at 09:46

## 2017-03-21 RX ADMIN — ACETAMINOPHEN 1000 MG: 10 INJECTION, SOLUTION INTRAVENOUS at 09:04

## 2017-03-21 RX ADMIN — NEOSTIGMINE METHYLSULFATE 3 MG: 0.5 INJECTION, SOLUTION INTRAVENOUS at 10:56

## 2017-03-21 RX ADMIN — EPHEDRINE SULFATE 10 MG: 50 INJECTION INTRAMUSCULAR; INTRAVENOUS; SUBCUTANEOUS at 10:51

## 2017-03-21 RX ADMIN — LIDOCAINE HYDROCHLORIDE 60 MG: 20 INJECTION, SOLUTION INFILTRATION; PERINEURAL at 08:49

## 2017-03-21 RX ADMIN — FENTANYL CITRATE 50 MCG: 50 INJECTION INTRAMUSCULAR; INTRAVENOUS at 11:35

## 2017-03-21 RX ADMIN — POTASSIUM CHLORIDE 10 MEQ: 750 CAPSULE, EXTENDED RELEASE ORAL at 17:31

## 2017-03-21 RX ADMIN — RIVAROXABAN 10 MG: 10 TABLET, FILM COATED ORAL at 17:31

## 2017-03-21 RX ADMIN — CETIRIZINE HYDROCHLORIDE 5 MG: 10 TABLET, FILM COATED ORAL at 15:37

## 2017-03-21 RX ADMIN — EPHEDRINE SULFATE 10 MG: 50 INJECTION INTRAMUSCULAR; INTRAVENOUS; SUBCUTANEOUS at 09:09

## 2017-03-21 RX ADMIN — DOCUSATE SODIUM,SENNOSIDES 2 TABLET: 50; 8.6 TABLET, FILM COATED ORAL at 17:31

## 2017-03-21 RX ADMIN — GLYCOPYRROLATE 0.4 MG: 0.2 INJECTION INTRAMUSCULAR; INTRAVENOUS at 10:56

## 2017-03-21 NOTE — PLAN OF CARE
Problem: Patient Care Overview (Adult)  Goal: Plan of Care Review    03/21/17 1450   Coping/Psychosocial Response Interventions   Plan Of Care Reviewed With patient   Outcome Evaluation   Outcome Summary/Follow up Plan Pt demonstrates impaired R LE strength, ROM, gait pattern, and activity tolerance. PT to return to PLOF.          Problem: Inpatient Physical Therapy  Goal: Bed Mobility Goal LTG- PT    03/21/17 1450   Bed Mobility PT LTG   Bed Mobility PT LTG, Date Established 03/21/17   Bed Mobility PT LTG, Time to Achieve 1 wk   Bed Mobility PT LTG, Activity Type supine to sit/sit to supine   Bed Mobility PT LTG, [place] Level supervision required       Goal: Transfer Training Goal 1 LTG- PT    03/21/17 1450   Transfer Training PT LTG   Transfer Training PT LTG, Date Established 03/21/17   Transfer Training PT LTG, Time to Achieve 1 wk   Transfer Training PT LTG, Activity Type sit to stand/stand to sit;bed to chair /chair to bed   Transfer Training PT LTG, [place] Level supervision required   Transfer Training PT LTG, Assist Device walker, rolling       Goal: Gait Training Goal LTG- PT    03/21/17 1450   Gait Training PT LTG   Gait Training Goal PT LTG, Date Established 03/21/17   Gait Training Goal PT LTG, Time to Achieve 1 wk   Gait Training Goal PT LTG, [place] Level supervision required   Gait Training Goal PT LTG, Assist Device walker, rolling   Gait Training Goal PT LTG, Distance to Achieve 150

## 2017-03-21 NOTE — PLAN OF CARE
"Problem: Patient Care Overview (Adult)  Goal: Plan of Care Review  Outcome: Ongoing (interventions implemented as appropriate)    03/21/17 0756   Coping/Psychosocial Response Interventions   Plan Of Care Reviewed With patient   Patient Care Overview   Progress progress toward functional goals as expected       Goal: Adult Individualization and Mutuality  Outcome: Ongoing (interventions implemented as appropriate)    03/21/17 0756   Individualization   Patient Specific Preferences PT PREFERS TO BE CALLED \"DIMITRIS\"    Patient Specific Goals TO BE RELAXED FOR SX TODAY.   Patient Specific Interventions TO GIVE RELAXING MED PER AA ORDER   Mutuality/Individual Preferences   What Anxieties, Fears or Concerns Do You Have About Your Health or Care? NONE AT THIS TIME.    What Questions Do You Have About Your Health or Care? NONE AT THIS TIME.   What Information Would Help Us Give You More Personalized Care? SEE ABOVE.       Goal: Discharge Needs Assessment  Outcome: Ongoing (interventions implemented as appropriate)    Problem: Perioperative Period (Adult)  Goal: Signs and Symptoms of Listed Potential Problems Will be Absent or Manageable (Perioperative Period)  Outcome: Ongoing (interventions implemented as appropriate)    03/21/17 0756   Perioperative Period   Problems Assessed (Perioperative Period) all   Problems Present (Perioperative Period) pain           "

## 2017-03-21 NOTE — PLAN OF CARE
Problem: Patient Care Overview (Adult)  Goal: Plan of Care Review  Outcome: Ongoing (interventions implemented as appropriate)    03/21/17 1450 03/21/17 1705   Coping/Psychosocial Response Interventions   Plan Of Care Reviewed With --  patient   Patient Care Overview   Progress --  improving   Outcome Evaluation   Outcome Summary/Follow up Plan Pt demonstrates impaired R LE strength, ROM, gait pattern, and activity tolerance. PT to return to PLOF.  --        Goal: Adult Individualization and Mutuality  Outcome: Ongoing (interventions implemented as appropriate)    Problem: Perioperative Period (Adult)  Goal: Signs and Symptoms of Listed Potential Problems Will be Absent or Manageable (Perioperative Period)  Outcome: Ongoing (interventions implemented as appropriate)  Goal: Signs and Symptoms of Listed Potential Problems Will be Absent or Manageable (Perioperative Period)  Outcome: Ongoing (interventions implemented as appropriate)    Problem: Hip Replacement, Total (Adult)  Goal: Signs and Symptoms of Listed Potential Problems Will be Absent or Manageable (Hip Replacement, Total)  Outcome: Ongoing (interventions implemented as appropriate)    Problem: Pain, Acute (Adult)  Goal: Identify Related Risk Factors and Signs and Symptoms  Outcome: Ongoing (interventions implemented as appropriate)  Goal: Acceptable Pain Control/Comfort Level  Outcome: Ongoing (interventions implemented as appropriate)

## 2017-03-21 NOTE — CONSULTS
[facility]  Cardiology Consult Note    Patient Identification:  Name: Monie Mccoy  Age: 67 y.o.  Sex: female  :  1950  MRN: [id number]             Requesting Physician:DR. CHOWDARY  Reason for Consultation / Chief Complaint: management recommendations  hypertension cardiac management    History of Present Illness:   67-year-old white female with known history of diabetes as well as benign essential arterial hypertension for several years underwent   Right total hip arthroplasty, direct anterior.   Denies any chest pains or tightness in chest no heaviness with a pressure sensation  Comorbid cardiac risk factors:     Past Medical History:  Past Medical History   Diagnosis Date   • Anxiety and depression    • Arthritis      OSTEO   • Cataract      BEGINNING   • Diabetes mellitus      TYPE 2   • High cholesterol    • History of colitis    • History of coronary artery disease    • History of diverticulitis    • History of [place] spotted fever      2 YR AGO   • History of transfusion 1978     S/P DELIVERY OF SON   • Hypertension    • Old MI (myocardial infarction)    • Right hip pain    • Seasonal allergies      Past Surgical History:  Past Surgical History   Procedure Laterality Date   • Knee arthroplasty Right    • Cholecystectomy     • Tubal abdominal ligation     • Colonoscopy     • Cardiac catheterization     • Coronary angioplasty with stent placement     • Shoulder arthroscopy Left    • Hysterectomy        Allergies:  Allergies   Allergen Reactions   • Amoxicillin Itching and Rash   • Furadantin [Nitrofurantoin] Hives, Itching and Rash     Home Meds:  Prescriptions Prior to Admission   Medication Sig Dispense Refill Last Dose   • acetaminophen (TYLENOL) 500 MG tablet Take 1,000 mg by mouth Every 6 (Six) Hours As Needed for mild pain (1-3) or moderate pain (4-6).   3/20/2017 at 0800   • hydrochlorothiazide (HYDRODIURIL) 25 MG tablet Take 25 mg by mouth Every Morning.   3/20/2017 at  "0900   • insulin detemir (LEVEMIR) 100 UNIT/ML injection Inject 40 Units under the skin Every Evening. PT TO CALL PCP REGARDING NPO AFTER MIDNIGHT AND SEE IF THEY WANT TO ADJUST INSULIN DOSE NIGHT BEFORE SURGERY   3/20/2017 at 2000   • LEVEMIR 100 UNIT/ML injection Inject 30 Units under the skin Every Morning.   3/20/2017 at 0900   • metFORMIN (GLUCOPHAGE) 500 MG tablet Take 500 mg by mouth 2 (Two) Times a Day With Meals.   3/20/2017 at 2000   • sertraline (ZOLOFT) 100 MG tablet Take 50 mg by mouth Daily.   3/20/2017 at 0800   • simvastatin (ZOCOR) 40 MG tablet Take 40 mg by mouth Every Night.   3/20/2017 at 2000   • TRAMADOL HCL PO Take 1 tablet by mouth As Needed. Pt does not know dosage, did not bring bottle in.   3/20/2017 at 2100   • aspirin 81 MG chewable tablet Chew 81 mg Daily. HOLD PRIOR TO SURGERY   3/14/2017   • CHLORHEXIDINE GLUCONATE CLOTH EX Apply  topically. AS DIRECTED:  PM DAY BEFORE SURGERY  AM DAY OF SURGERY   3/21/2017 at 0400   • diclofenac (VOLTAREN) 75 MG EC tablet Take 75 mg by mouth 2 (Two) Times a Day. HOLD PRIOR TO SURGERY   3/14/2017   • levocetirizine (XYZAL) 5 MG tablet Take 5 mg by mouth Every Evening.   3/18/2017   • lisinopril (PRINIVIL,ZESTRIL) 40 MG tablet Take 40 mg by mouth Every Night.   3/20/2017 at 2000   • magnesium oxide (MAG-OX) 400 MG tablet Take 400 mg by mouth Daily. HOLD PRIOR TO SURGERY   3/14/2017   • metoprolol succinate XL (TOPROL-XL) 50 MG 24 hr tablet Take 50 mg by mouth Every Morning.   3/21/2017 at 0400   • montelukast (SINGULAIR) 10 MG tablet Take 10 mg by mouth Every Night.   3/18/2017   • mupirocin (BACTROBAN) 2 % nasal ointment into each nostril. AS DIRECTED:  AM & PM DAY BEFORE SURGERY  AM DAY OF SURGERY   3/21/2017 at 0400   • potassium chloride (K-DUR) 10 MEQ CR tablet Take 10 mEq by mouth Daily.   3/14/2017   • RELION INSULIN SYR 0.5CC/29G 29G X 1/2\" 0.5 ML misc    3/20/2017 at 2000     Current Meds:   [unfilled]  Social History:   Social History "   Substance Use Topics   • Smoking status: Former Smoker     Packs/day: 2.00     Years: 30.00     Types: Cigarettes     Quit date: 1999   • Smokeless tobacco: Not on file   • Alcohol use No      Family History:  History reviewed. No pertinent family history.     Review of Systems    Constitutional: No weakness,fatigue, fever, rigors, chills   Eyes: No vision changes, eye pain   ENT/oropharynx: No difficulty swallowing, sore throat, epistaxis, changes in hearing   Cardiovascular: No chest pain, chest tightness, palpitations, paroxysmal nocturnal dyspnea, orthopnea, diaphoresis, dizziness / syncopal episode   Respiratory: No shortness of breath, dyspnea on exertion, cough, wheezing hemoptysis   Gastrointestinal: No abdominal pain, nausea, vomiting, diarrhea, bloody stools   Genitourinary: No hematuria, dysuria   Neurological: No headache, tremors, numbness,  one-sided weakness    Musculoskeletal: No cramps, myalgias,  joint pain, joint swelling   Integument: No rash, edema           Constitutional:  Temp:  [97.6 °F (36.4 °C)-98.7 °F (37.1 °C)] 97.7 °F (36.5 °C)  Heart Rate:  [63-85] 74  Resp:  [16-18] 16  BP: ()/(52-71) 95/60    Physical Exam by Rudy Davila MD  General:  Appears in no acute distress  Eyes: PERTL,  HEENT:  No JVD. Thyroid not visibly enlarged. No mucosal pallor or cyanosis  Respiratory: Respirations regular and unlabored at rest. Equal chest expansion. BBS with good air entry in all fields. No crackles, rubs or wheezes auscultated  Cardiovascular: S1S2 Regular rate and rhythm. No murmur, rub or gallop auscultated. No carotid bruits. DP/PT pulses    . No pretibial pitting edema  Gastrointestinal: Abdomen soft, flat, non tender. Bowel sounds present. No hepatosplenomegaly. No ascites  Musculoskeletal: HERNANDEZ x4. No abnormal movements  Extremities: No digital clubbing or cyanosis  Skin: Color pink. Skin warm and dry to touch. No rashes  No xanthoma  Neuro: AAO x3 CN II-XII grossly  intact            Cardiographics  ECG:     Telemetry:    Echocardiogram:     Imaging  Chest X-ray:     Lab Review               @[email or web address]@              Assessment:  Right total hip arthroplasty, direct anterior.   Diabetes  Hypertension    Recommendations / Plan:   Resume cardiac medications  Anticoagulation  EKG in the morning        I reviewed the patient's clinical results, medications and treatment plan  personally viewed and interpreted the patient's EKG/Telemetry data    Rudy Davila MD  3/21/2017, 3:18 PM      EMR Dragon/Transcription disclaimer:   Much of this encounter note is an electronic transcription/translation of spoken language to printed text. The electronic translation of spoken language may permit erroneous, or at times, nonsensical words or phrases to be inadvertently transcribed; Although I have reviewed the note for such errors, some may still exist.

## 2017-03-21 NOTE — PERIOPERATIVE NURSING NOTE
DR RAYMOND HERE IN ROOM, NOTIFIED HIM OF PT'S EARLIER BLOOD SUGAR READING, DR BRANTLEY'S ORDER, AND NEW BLOOD SUGAR READING 112, HE STATES OK, NO NEW ORDERS NOTED.

## 2017-03-21 NOTE — PROGRESS NOTES
Acute Care - Physical Therapy Initial Evaluation  [facility]     Patient Name: Monie Mccoy  : 1950  MRN: [id number]  Today's Date: 3/21/2017   Onset of Illness/Injury or Date of Surgery Date: 17            Admit Date: 3/21/2017     Visit Dx:    ICD-10-CM ICD-9-CM   1. Osteoarthritis of one hip, right M16.11 715.95     Patient Active Problem List   Diagnosis   • History of total knee arthroplasty   • Right hip pain   • Primary osteoarthritis of right hip     Past Medical History   Diagnosis Date   • Anxiety and depression    • Arthritis      OSTEO   • Cataract      BEGINNING   • Diabetes mellitus      TYPE 2   • High cholesterol    • History of colitis    • History of coronary artery disease    • History of diverticulitis    • History of [place] spotted fever      2 YR AGO   • History of transfusion 1978     S/P DELIVERY OF SON   • Hypertension    • Old MI (myocardial infarction)    • Right hip pain    • Seasonal allergies      Past Surgical History   Procedure Laterality Date   • Knee arthroplasty Right    • Cholecystectomy     • Tubal abdominal ligation     • Colonoscopy     • Cardiac catheterization     • Coronary angioplasty with stent placement     • Shoulder arthroscopy Left    • Hysterectomy            PT ASSESSMENT (last 72 hours)      PT Evaluation       17 1448 17 0747    Rehab Evaluation    Document Type evaluation  -AR     Subjective Information agree to therapy  -AR     Patient Effort, Rehab Treatment good  -AR     General Information    Patient Profile Review yes  -AR     Onset of Illness/Injury or Date of Surgery Date 17  -AR     Precautions/Limitations hip precautions- left;anterior hip precautions- right  -AR     Prior Level of Function independent:  -AR     Equipment Currently Used at Home none  -AR grab bar;walker, rolling;cane, straight;raised toilet;shower chair  -SS    Barriers to Rehab none identified  -AR     Living Environment    Lives With spouse   -AR spouse;child(andrzej), adult;child(andrzej), dependent  -SS    Living Arrangements house  -AR house  -SS    Home Accessibility no concerns  -AR bed and bath on same level;stairs within home  -SS    Number of Stairs Within Home  12   to the basement  -SS    Stair Railings at Home  inside, present at both sides  -SS    Type of Financial/Environmental Concern  none  -SS    Transportation Available  car  -SS    Clinical Impression    Patient/Family Goals Statement DC home  -AR     Criteria for Skilled Therapeutic Interventions Met yes  -AR     Pathology/Pathophysiology Noted (Describe Specifically for Each System) musculoskeletal  -AR     Impairments Found (describe specific impairments) aerobic capacity/endurance;gait, locomotion, and balance  -AR     Rehab Potential good, to achieve stated therapy goals  -AR     Pain Assessment    Pain Assessment No/denies pain  -AR     Cognitive Assessment/Intervention    Current Cognitive/Communication Assessment impaired  -AR     Orientation Status oriented x 4  -AR     Follows Commands/Answers Questions 100% of the time  -AR     ROM (Range of Motion)    General ROM --   WFL except R hip  -AR     MMT (Manual Muscle Testing)    General MMT Assessment --   WFL except R LE  -AR     Bed Mobility, Assessment/Treatment    Bed Mobility, Assistive Device bed rails;head of bed elevated  -AR     Bed Mob, Supine to Sit, [place] supervision required  -AR     Bed Mob, Sit to Supine, [place] not tested  -AR     Transfer Assessment/Treatment    Transfers, Sit-Stand [place] minimum assist (75% patient effort)  -AR     Transfers, Stand-Sit [place] minimum assist (75% patient effort)  -AR     Transfers, Sit-Stand-Sit, Assist Device rolling walker  -AR     Toilet Transfer, [place] minimum assist (75% patient effort)  -AR     Toilet Transfer, Assistive Device rolling walker  -AR     Gait Assessment/Treatment    Gait, [place] Level minimum assist (75% patient effort)  -AR      Gait, Assistive Device rolling walker  -AR     Gait, Distance (Feet) 15  -AR     Gait, Gait Pattern Analysis swing-through gait  -AR     Gait, Gait Deviations hoa decreased;decreased heel strike;forward flexed posture  -AR     Gait, Safety Issues weight-shifting ability decreased;step length decreased  -AR     Gait, Impairments impaired balance;strength decreased;ROM decreased  -AR     Therapy Exercises    Exercise Protocols total hip  -AR     Total Hip Exercises right:;10 reps;completed protocol  -AR     Positioning and Restraints    Pre-Treatment Position in bed  -AR     Post Treatment Position chair  -AR     In Chair reclined;sitting;call light within reach;encouraged to call for assist  -AR       User Key  (r) = Recorded By, (t) = Taken By, (c) = Cosigned By    Initials Name Provider Type     Abiola Kilgore, RN Registered Nurse    GIOVANNI Dorman PT Physical Therapist          Physical Therapy Education     Title: PT OT SLP Therapies (Done)     Topic: Physical Therapy (Done)     Point: Mobility training (Done)    Learning Progress Summary    Learner Readiness Method Response Comment Documented by Status   Patient Acceptance E [facility] 03/21/17 1451 Done               Point: Home exercise program (Done)    Learning Progress Summary    Learner Readiness Method Response Comment Documented by Status   Patient Acceptance E [facility] 03/21/17 1451 Done               Point: Body mechanics (Done)    Learning Progress Summary    Learner Readiness Method Response Comment Documented by Status   Patient Acceptance E [facility] 03/21/17 1451 Done               Point: Precautions (Done)    Learning Progress Summary    Learner Readiness Method Response Comment Documented by Status   Patient Acceptance E [facility] 03/21/17 1451 Done                      User Key     Initials Effective Dates Name Provider Type [facility] 06/27/16 -  Jeniffer Dorman PT Physical Therapist PT                PT Recommendation and  Plan  Anticipated Discharge Disposition: home with assist, home with home health  Planned Therapy Interventions: balance training, bed mobility training, gait training, home exercise program, patient/family education, ROM (Range of Motion), stair training, strengthening  PT Frequency: 2 times/day  Plan of Care Review  Plan Of Care Reviewed With: patient  Outcome Summary/Follow up Plan: Pt demonstrates impaired R LE strength, ROM, gait pattern, and activity tolerance.  PT to return to PLOF.           IP PT Goals       03/21/17 1450          Bed Mobility PT LTG    Bed Mobility PT LTG, Date Established 03/21/17  -AR      Bed Mobility PT LTG, Time to Achieve 1 wk  -AR      Bed Mobility PT LTG, Activity Type supine to sit/sit to supine  -AR      Bed Mobility PT LTG, [place] Level supervision required  -AR      Transfer Training PT LTG    Transfer Training PT LTG, Date Established 03/21/17  -AR      Transfer Training PT LTG, Time to Achieve 1 wk  -AR      Transfer Training PT LTG, Activity Type sit to stand/stand to sit;bed to chair /chair to bed  -AR      Transfer Training PT LTG, [place] Level supervision required  -AR      Transfer Training PT LTG, Assist Device walker, rolling  -AR      Gait Training PT LTG    Gait Training Goal PT LTG, Date Established 03/21/17  -AR      Gait Training Goal PT LTG, Time to Achieve 1 wk  -AR      Gait Training Goal PT LTG, [place] Level supervision required  -AR      Gait Training Goal PT LTG, Assist Device walker, rolling  -AR      Gait Training Goal PT LTG, Distance to Achieve 150  -AR        User Key  (r) = Recorded By, (t) = Taken By, (c) = Cosigned By    Initials Name Provider Type    AR Jeniffer Dandy PT Physical Therapist                Outcome Measures       03/21/17 1400          How much help from another person do you currently need...    Turning from your back to your side while in flat bed without using bedrails? 4  -AR      Moving from lying on back to  sitting on the side of a flat bed without bedrails? 4  -AR      Moving to and from a bed to a chair (including a wheelchair)? 3  -AR      Standing up from a chair using your arms (e.g., wheelchair, bedside chair)? 3  -AR      Climbing 3-5 steps with a railing? 3  -AR      To walk in hospital room? 3  -AR      AM-PAC 6 Clicks Score 20  -AR      Functional Assessment    Outcome Measure Options AM-PAC 6 Clicks Basic Mobility (PT)  -AR        User Key  (r) = Recorded By, (t) = Taken By, (c) = Cosigned By    Initials Name Provider Type    AR Jeniffer Dorman PT Physical Therapist           Time Calculation:         PT Charges       03/21/17 1452          Time Calculation    Start Time 1425  -AR      Stop Time 1452  -AR      Time Calculation (min) 27 min  -AR      PT Received On 03/21/17  -AR      PT - Next Appointment 03/22/17  -AR      PT Goal Re-Cert Due Date 03/28/17  -AR        User Key  (r) = Recorded By, (t) = Taken By, (c) = Cosigned By    Initials Name Provider Type    GIOVANNI Dorman PT Physical Therapist          Therapy Charges for Today     Code Description Service Date Service Provider Modifiers Qty    83273696162 HC PT EVAL LOW COMPLEXITY 2 3/21/2017 Jeniffer Dorman, PT GP 1    04674258830 HC PT THER PROC EA 15 MIN 3/21/2017 Jeniffer Dorman PT GP 1    66256141218 HC PT THER SUPP EA 15 MIN 3/21/2017 Jeniffer Dorman PT GP 1          PT G-Codes  Outcome Measure Options: AM-PAC 6 Clicks Basic Mobility (PT)      Jeniffer Dorman PT  3/21/2017

## 2017-03-21 NOTE — ANESTHESIA PROCEDURE NOTES
Airway  Urgency: elective    Airway not difficult    General Information and Staff    Patient location during procedure: OR  Anesthesiologist: YASMIN KIM    Indications and Patient Condition  Indications for airway management: airway protection    Preoxygenated: yes  Mask difficulty assessment: 1 - vent by mask    Final Airway Details  Final airway type: endotracheal airway      Successful airway: ETT  Cuffed: yes   Successful intubation technique: direct laryngoscopy  Endotracheal tube insertion site: oral  Blade: Francia  Blade size: #3  ETT size: 7.0 mm  Cormack-Lehane Classification: grade I - full view of glottis  Placement verified by: chest auscultation, bronchoscopy and capnometry   Cuff volume (mL): 5  Measured from: teeth  ETT to teeth (cm): 21  Number of attempts at approach: 1

## 2017-03-21 NOTE — OP NOTE
TOTAL HIP ARTHROPLASTY     PATIENT NAME:  Roberto Mccoy    YOB: 1950    ATTENDING PHYSICIAN: Lalit Birch M.D.    DATE OF PROCEDURE: Tuesday, 21 March 2017    PREOPERATIVE DIAGNOSIS: Severe degenerative osteoarthritis right hip    POSTOPERATIVE DIAGNOSIS: Severe primary osteoarthritis right hip    PRINCIPAL DIAGNOSIS: Same    PROCEDURE: Right total hip arthroplasty, direct anterior.    SURGEON:  Lalit Birch M.D.    ASSISTANT: Fab Nuñez    ANESTHESIOLOGIST: DEEPTI Ayala MD    ANESTHESIA: Gen. with an LMA    POSITION: Supine on a [place] table.    DRAINS: None    COMPLICATIONS: None    ESTIMATED BLOOD LOSS: 250 ml    IMPLANT USED: Nati press-fit total hip replacement system, # 3 Avenir HA coated stem with a standard offset, 32 mm diameter ceramic head, -3.5 mm neck length, 48 mm Nati continuum TM coated multi-hole cup with a single dome screw in the posterosuperior quadrant with a highly cross-linked, Vitamin E impregnated, polyethylene liner neutral.    INDICATION: Severe pain and limp and difficulty in walking due to left hip pain  All risks and benefits, potential complications, possibility of death, infection, myocardial infarction, DVT, pulmonary embolism, wound and soft tissue breakdown, dislocation of the prosthesis, limb length discrepancy, etc. were all discussed with the patient and an informed consent was signed.     DETAILS OF PROCEDURE: Surgical timeout was called. Operative extremity was correctly identified in the operating room suite. Patient was given antibiotics per the SCIP protocol.  Patient was placed under appropriate anesthetic. C-arm was used through the entire procedure to accurately evaluate the limb lengths and the stability of the components as well as appropriate positioning of the components in the proximal femur and the acetabulum.  Patient was placed on the [place] table, prepped and draped in a standard fashion.  A direct anterior approach was carried out.       The fascia over the TFL was incised. The TFL was retracted laterally. A Cobra retractor was placed on the superior aspect of the femoral neck. A second Cobra was placed on the inferior aspect of the femoral neck. The leg was manipulated and the anterior capsule was carefully identified. The rectus femoris was retracted medially. Distally, the vastus lateralis was mobilized with a Hansen elevator and L-shaped capsulotomy was carried out and the flaps of the capsule were developed. FiberWire sutures were placed in the flaps of the capsule to allow for mobilization and subsequent closure of the soft tissue envelope. The Cobra retractors were placed subperiosteally, one inferior to the femoral neck and one superior to the femoral neck. The dissection was carried out up to the saddle of the greater trochanter laterally. The femoral osteotomy was carried out along the line of the broach. A napkin ring segment of femoral neck was removed to allow easy removal of the femoral head. A motorized corkscrew was used and the femoral head was removed from the acetabulum.    Retractors were placed around the acetabulum at the 4 o’clock to 7 o’clock and the 11 o’clock positions. Acetabular labrum was resected. The ligamentum teres was resected. Reaming was commenced under direct C-arm control.  Appropriate amounts of flexion, abduction and anteversion were built into the reaming process. Smaller sized reamer was used and the smaller sized reamer was used to medialize the acetabulum up to the teardrop, which was visualized on the C-arm monitors. The reaming size went up progressively up until a good bed of bleeding subchondral bone was exposed. The acetabulum was then lavaged with Bacitracin irrigating solution. Diluted Betadine was used in antibiotic solution and the multi-hole cup, 48 mm in diameter was then impacted and locked into position on the native acetabulum. Appropriate amounts of flexion, abduction and anteversion were  built into the positioning of the cup. A screw was placed in the posterosuperior quadrant to augment the stability of the cup. The neutral polyethylene liner, Vitamin E impregnated, highly cross-linked was impacted and locked into position as well.    Attention was then directed towards the proximal femur.  The proximal femur was delivered into the wound with a combination of adduction, extension and external rotation.  The pigtail device was placed subperiosteally under the proximal femur and attached to the [place] table to stabilize the femur for instrumentation.  The piriformis fossa was cleared of all soft tissue.  The cancellus bone on the internal aspect of the lateral femur was removed with a curet to minimize the malpositioning of the broaches into varus.  The rat tailed device was then used to enter into the femoral canal.  We then commenced broaching with the smallest broach.  The broach was leaned up against the lateral cortex to minimize varus-valgus malposition.  We broached up to the point that the proximal metaphysis was well filled and there was good torsional stability to the broach.  A trial head and neck was placed and the femur was reduced into the acetabulum.  The limb lengths were checked and found to be anatomically accurate.  Intraoperative C-arm images were obtained to determine the limb lengths and the positioning of the components.  These were found to be anatomically accurate.  The femoral canal was then lavaged with bacitracin irrigating solution and dried.  The #3Avenir HA coated, femoral component was impacted into position and a good fit was obtained.  There was good torsional stability.  Some bone graft was packed around the femoral component to promote ingrowth.  The trunnion of the femoral component was dried and the head ball was impacted and locked onto the femoral component.  Final reduction was carried out.  The stability and limb length were checked one more time.  There was no  tendency towards dislocation of the components in any position of the lower extremity.  Limb lengths were checked on C-arm images found to be anatomical.  The capsule and the sub-periosteal structures were infiltrated with a analgesic for postoperative analgesia.  The anterior capsule was repaired with interrupted sutures.  The fascia over the TFL was repaired with interrupted suture.  Subcuticular sutures applied.  Occlusive dressing was applied.  Sponge gauze and needle count was correct.  No complications were encountered.  Patient was reversed metastatic taken from the operating room to the recovery room in stable condition.  No complications encountered.  I discussed the satisfactory performance is procedure with the patient's family and answered all questions for them.

## 2017-03-21 NOTE — ANESTHESIA POSTPROCEDURE EVALUATION
Patient: Monie Mccoy    Procedure Summary     Date Anesthesia Start Anesthesia Stop Room / Location    03/21/17 0842 1105  [facility] /  [facility]       Procedure Diagnosis Surgeon Provider    TOTAL HIP ARTHROPLASTY ANTERIOR WITH HANA TABLE (Right Hip) Osteoarthritis of one hip, right  (Osteoarthritis of one hip, right [M16.11]) MD Uriel Serrano MD          Anesthesia Type: general  Last vitals  /71 (03/21/17 1110)    Temp      Pulse 67 (03/21/17 1120)   Resp 16 (03/21/17 1110)    SpO2 98 % (03/21/17 1110)      Post Anesthesia Care and Evaluation    Patient location during evaluation: PACU  Patient participation: complete - patient participated  Level of consciousness: awake and alert  Pain management: adequate  Airway patency: patent  Anesthetic complications: No anesthetic complications    Cardiovascular status: acceptable  Respiratory status: acceptable  Hydration status: acceptable

## 2017-03-21 NOTE — H&P
History & Physical       Patient: Monie Mccoy    Date of Admission: 3/21/2017  6:24 AM    YOB: 1950    Medical Record Number: [id number]    Attending Physician: Lalit Birch MD        Chief Complaints: Osteoarthritis of one hip, right [M16.11]  Primary osteoarthritis of right hip [M16.11]      History of Present Illness: 67 y.o. female presents with Osteoarthritis of one hip, right [M16.11]  Primary osteoarthritis of right hip [M16.11]. Onset of symptoms was gradual and progressively worse.  Symptoms are associated with pain and limp on right side.  Symptoms are aggravated by walking upstairs.   Symptoms improve with rest and nsaids. Patient is now being admitted to the services of Lalit Birch MD for further evaluation and treatment.        Allergies   Allergen Reactions   • Amoxicillin Itching and Rash   • Furadantin [Nitrofurantoin] Hives, Itching and Rash         Home Medications:  Prescriptions Prior to Admission   Medication Sig Dispense Refill Last Dose   • acetaminophen (TYLENOL) 500 MG tablet Take 1,000 mg by mouth Every 6 (Six) Hours As Needed for mild pain (1-3) or moderate pain (4-6).      • aspirin 81 MG chewable tablet Chew 81 mg Daily. HOLD PRIOR TO SURGERY   1/10/2017   • CHLORHEXIDINE GLUCONATE CLOTH EX Apply  topically. AS DIRECTED:  PM DAY BEFORE SURGERY  AM DAY OF SURGERY      • diclofenac (VOLTAREN) 75 MG EC tablet Take 75 mg by mouth 2 (Two) Times a Day. HOLD PRIOR TO SURGERY   1/10/2017   • hydrochlorothiazide (HYDRODIURIL) 25 MG tablet Take 25 mg by mouth Every Morning.      • insulin detemir (LEVEMIR) 100 UNIT/ML injection Inject 40 Units under the skin Every Evening. PT TO CALL PCP REGARDING NPO AFTER MIDNIGHT AND SEE IF THEY WANT TO ADJUST INSULIN DOSE NIGHT BEFORE SURGERY      • LEVEMIR 100 UNIT/ML injection Inject 30 Units under the skin Every Morning.      • levocetirizine (XYZAL) 5 MG tablet Take 5 mg by mouth Every Evening.      • lisinopril (PRINIVIL,ZESTRIL) 40 MG  "tablet Take 40 mg by mouth Daily.      • magnesium oxide (MAG-OX) 400 MG tablet Take 400 mg by mouth Daily. HOLD PRIOR TO SURGERY   1/10/2017   • metFORMIN (GLUCOPHAGE) 500 MG tablet Take 500 mg by mouth 2 (Two) Times a Day With Meals.      • metoprolol succinate XL (TOPROL-XL) 50 MG 24 hr tablet Take 50 mg by mouth Every Morning.      • montelukast (SINGULAIR) 10 MG tablet Take 10 mg by mouth Every Night.      • mupirocin (BACTROBAN) 2 % nasal ointment into each nostril. AS DIRECTED:  AM & PM DAY BEFORE SURGERY  AM DAY OF SURGERY      • potassium chloride (K-DUR) 10 MEQ CR tablet Take 10 mEq by mouth Daily.      • RELION INSULIN SYR 0.5CC/29G 29G X 1/2\" 0.5 ML misc       • sertraline (ZOLOFT) 100 MG tablet Take 50 mg by mouth Daily.      • simvastatin (ZOCOR) 40 MG tablet Take 40 mg by mouth Every Night.          Current Medications:  Scheduled Meds:  Continuous Infusions:  No current facility-administered medications for this encounter.   PRN Meds:.       Past Medical History   Diagnosis Date   • Anxiety and depression    • Arthritis      OSTEO   • Cataract      BEGINNING   • Diabetes mellitus      TYPE 2   • High cholesterol    • History of colitis    • History of coronary artery disease    • History of diverticulitis    • History of [place] spotted fever      2 YR AGO   • History of transfusion 1978     S/P DELIVERY OF SON   • Hypertension    • Old MI (myocardial infarction) 1999   • Right hip pain    • Seasonal allergies         Past Surgical History   Procedure Laterality Date   • Knee arthroplasty Right    • Cholecystectomy     • Tubal abdominal ligation     • Colonoscopy     • Cardiac catheterization     • Coronary angioplasty with stent placement     • Shoulder arthroscopy Left    • Hysterectomy          Social History     Occupational History   • Not on file.     Social History Main Topics   • Smoking status: Former Smoker     Packs/day: 2.00     Years: 30.00     Types: Cigarettes     Quit date: " 1999   • Smokeless tobacco: Not on file   • Alcohol use No   • Drug use: No   • Sexual activity: Not on file    Social History     Social History Narrative      No family history on file.      Review of Systems:   HEENT: Patient denies any headaches, vision changes, change in hearing, or tinnitus, Patient denies any rhinorrhea,epistaxis, sinus pain, mouth or dental problems, sore throat or hoarseness, or dysphagia  Pulmonary: Patient denies any cough, congestion, SOA, or wheezing  Cardiovascular: Patient denies any chest pain, dyspnea, palpitations, weakness, intolerance of exercise, varicosities, swelling of extremities, known murmur  Gastrointestinal:  Patient denies nausea, vomiting, diarrhea, constipation, loss  of appetite, change in appetite, dysphagia, gas, heartburn, melena, change in bowel habits, use of laxatives or other drugs to alter the function of the gastrointestinal tract.  Genital/Urinary: Patient denies dysuria, change in color of urine, change in frequency of urination, pain with urgency, incontinence, retention, or nocturia.  Musculoskeletal: Patient denies increased warmth; redness; or swelling of joints; limitation of function; deformity; crepitation: pain in a joint or an extremity, the neck, or the back, especially with movement.  Neurological: Patient denies dizziness, tremor, ataxia, difficulty in speaking, change in speech, paresthesia, loss of sensation, seizures, syncope, changes in memory.  Endocrine system: Patient denies tremors, palpitations, intolerance of heat or cold, polyuria, polydipsia, polyphagia, diaphoresis, exophthalmos, or goiter.  Psychological: Patient denies thoughts/plans or harming self or other; depression,  insomnia, night terrors, belem, memory loss, disorientation.  Skin: Patient denies any bruising, rashes, discoloration, pruritus, wounds, ulcers, decubiti, changes in the hair or nails  Hematopoietic: Patient denies history of spontaneous or excessive bleeding,  epistaxis, hematuria, melena, fatigue, enlarged or tender lymph nodes, pallor, history of anemia.    Physical Exam: 67 y.o. female  There were no vitals filed for this visit.    General Appearance:          Alert, cooperative, in no acute distress                                                 Head:    Normocephalic, without obvious abnormality, atraumatic   Eyes:            Lids and lashes normal, conjunctivae and sclerae normal, no   icterus, no pallor, corneas clear, PERRLA   Ears:    Ears appear intact with no abnormalities noted   Throat:   No oral lesions, no thrush, oral mucosa moist   Neck:   No adenopathy, supple, trachea midline, no thyromegaly, no   carotid bruit, no JVD   Back:     No kyphosis present, no scoliosis present, no skin lesions,      erythema or scars, no tenderness to percussion or                   palpation,   range of motion normal   Lungs:     Clear to auscultation,respirations regular, even and                  unlabored    Heart:    Regular rhythm and normal rate, normal S1 and S2, no            murmur, no gallop, no rub, no click   Chest Wall:    No abnormalities observed   Abdomen:     Normal bowel sounds, no masses, no organomegaly, soft        non-tender, non-distended, no guarding, no rebound                tenderness   Rectal:     Deferred   Extremities:   Tenderness over anterior joiont line of the hip   . Moves all extremities well, no edema,   no cyanosis, no redness   Pulses:   Pulses palpable and equal bilaterally   Skin:   No bleeding, bruising or rash   Lymph nodes:   No palpable adenopathy   Neurologic:   Cranial nerves 2 - 12 grossly intact, sensation intact, DTR       present and equal bilaterally      right hip. Neurovascular status is intact. Patient has a distant limp on the affected side. Internal and external rotations are associated with pain and discomfort. Anterior joint line pain and tenderness is significant. Stinchfield sign is positive. Figure of 4 sign is  positive. Patient is unable to perform an active straight leg raise exam. Greater trochanter is tender. Crossover adduction test is positive. Cross body adduction is limited and painful for the patient. Patient has very significant limp and joint line tenderness anteriorly, posteriorly and medially. Dorsalis pedis and posterior tibial artery pulses are palpable. Common peroneal nerve function is well preserved.     Diagnostic Tests:  No visits with results within 2 Day(s) from this visit.  Latest known visit with results is:    Appointment on 03/14/2017   Component Date Value Ref Range Status   • Urine Culture 03/14/2017 No growth   Final   • Glucose 03/14/2017 137* 65 - 99 mg/dL Final   • BUN 03/14/2017 31* 8 - 23 mg/dL Final   • Creatinine 03/14/2017 1.25* 0.57 - 1.00 mg/dL Final   • Sodium 03/14/2017 139  136 - 145 mmol/L Final   • Potassium 03/14/2017 4.1  3.5 - 5.2 mmol/L Final   • Chloride 03/14/2017 100  98 - 107 mmol/L Final   • CO2 03/14/2017 25.8  22.0 - 29.0 mmol/L Final   • Calcium 03/14/2017 9.2  8.6 - 10.5 mg/dL Final   • eGFR Non African Amer 03/14/2017 43* >60 mL/min/1.73 Final   • BUN/Creatinine Ratio 03/14/2017 24.8  7.0 - 25.0 Final   • Anion Gap 03/14/2017 13.2  mmol/L Final   • WBC 03/14/2017 9.24  4.50 - 10.70 10*3/mm3 Final   • RBC 03/14/2017 4.49  3.90 - 5.20 10*6/mm3 Final   • Hemoglobin 03/14/2017 11.3* 11.9 - 15.5 g/dL Final   • Hematocrit 03/14/2017 36.9  35.6 - 45.5 % Final   • MCV 03/14/2017 82.2  80.5 - 98.2 fL Final   • MCH 03/14/2017 25.2* 26.9 - 32.0 pg Final   • MCHC 03/14/2017 30.6* 32.4 - 36.3 g/dL Final   • RDW 03/14/2017 15.6* 11.7 - 13.0 % Final   • RDW-SD 03/14/2017 46.9  37.0 - 54.0 fl Final   • MPV 03/14/2017 9.9  6.0 - 12.0 fL Final   • Platelets 03/14/2017 310  140 - 500 10*3/mm3 Final   • Neutrophil % 03/14/2017 60.5  42.7 - 76.0 % Final   • Lymphocyte % 03/14/2017 34.0  19.6 - 45.3 % Final   • Monocyte % 03/14/2017 4.0* 5.0 - 12.0 % Final   • Eosinophil % 03/14/2017 1.1   0.3 - 6.2 % Final   • Basophil % 03/14/2017 0.2  0.0 - 1.5 % Final   • Immature Grans % 03/14/2017 0.2  0.0 - 0.5 % Final   • Neutrophils, Absolute 03/14/2017 5.59  1.90 - 8.10 10*3/mm3 Final   • Lymphocytes, Absolute 03/14/2017 3.14  0.90 - 4.80 10*3/mm3 Final   • Monocytes, Absolute 03/14/2017 0.37  0.20 - 1.20 10*3/mm3 Final   • Eosinophils, Absolute 03/14/2017 0.10  0.00 - 0.70 10*3/mm3 Final   • Basophils, Absolute 03/14/2017 0.02  0.00 - 0.20 10*3/mm3 Final   • Immature Grans, Absolute 03/14/2017 0.02  0.00 - 0.03 10*3/mm3 Final   • Color, UA 03/14/2017 Yellow  Yellow, Straw Final   • Appearance, UA 03/14/2017 Clear  Clear Final   • pH, UA 03/14/2017 6.0  5.0 - 8.0 Final   • Specific Gravity, UA 03/14/2017 1.009  1.005 - 1.030 Final   • Glucose, UA 03/14/2017 Negative  Negative Final   • Ketones, UA 03/14/2017 Negative  Negative Final   • Bilirubin, UA 03/14/2017 Negative  Negative Final   • Blood, UA 03/14/2017 Negative  Negative Final   • Protein, UA 03/14/2017 Negative  Negative Final   • Leuk Esterase, UA 03/14/2017 Negative  Negative Final   • Nitrite, UA 03/14/2017 Negative  Negative Final   • Urobilinogen, UA 03/14/2017 0.2 E.U./dL  0.2 - 1.0 E.U./dL Final   • RBC, UA 03/14/2017 0-2  None Seen, 0-2 /HPF Final   • WBC, UA 03/14/2017 0-2  None Seen, 0-2 /HPF Final   • Bacteria, UA 03/14/2017 None Seen  None Seen /HPF Final   • Squamous Epithelial Cells, UA 03/14/2017 0-2  None Seen, 0-2 /HPF Final   • Hyaline Casts, UA 03/14/2017 None Seen  None Seen /LPF Final   • Methodology 03/14/2017 Automated Microscopy   Final     No results found.      Assessment:  Patient Active Problem List   Diagnosis   • History of total knee arthroplasty   • Right hip pain   • Primary osteoarthritis of right hip         Plan:  The patient voiced understanding of the risks, benefits, and alternative forms of treatment that were discussed and the patient consents to proceed with right Total hip replacement.       Discharge  Plan: 3-4 days to home and home health      Date: 3/21/2017    Lalit Birch MD      EMR Dragon/Transcription disclaimer:   Much of this encounter note is an electronic transcription/translation of spoken language to printed text. The electronic translation of spoken language may per tee erroneous or at times nonsensical words or phrases to be inadvertently transcribed. Although I have reviewed this note for such errors, some may still exist.

## 2017-03-21 NOTE — ANESTHESIA PREPROCEDURE EVALUATION
Anesthesia Evaluation     Patient summary reviewed and Nursing notes reviewed   no history of anesthetic complications:  NPO Status: > 6 hours   Airway   Mallampati: II  TM distance: >3 FB  Neck ROM: full  no difficulty expected  Dental          Pulmonary - normal exam   (+) asthma (taking Singular),   Cardiovascular - normal exam  Exercise tolerance: good (4-7 METS)    ECG reviewed    (+) hypertension, cardiac stents (1999)   Past MI: 1999.      Neuro/Psych  GI/Hepatic/Renal/Endo    (+)  diabetes mellitus (AC 46 this AM pt asympatomatic recieved 1/2 AMP D50 AC now 112) using insulin,     Musculoskeletal     Abdominal    Substance History      OB/GYN          Other                                    Anesthesia Plan    ASA 3     general     Anesthetic plan and risks discussed with patient.

## 2017-03-22 LAB
ANION GAP SERPL CALCULATED.3IONS-SCNC: 16.2 MMOL/L
BASOPHILS # BLD AUTO: 0.03 10*3/MM3 (ref 0–0.2)
BASOPHILS NFR BLD AUTO: 0.3 % (ref 0–1.5)
BUN BLD-MCNC: 20 MG/DL (ref 8–23)
BUN/CREAT SERPL: 19.8 (ref 7–25)
CALCIUM SPEC-SCNC: 8.1 MG/DL (ref 8.6–10.5)
CHLORIDE SERPL-SCNC: 94 MMOL/L (ref 98–107)
CO2 SERPL-SCNC: 21.8 MMOL/L (ref 22–29)
CREAT BLD-MCNC: 1.01 MG/DL (ref 0.57–1)
DEPRECATED RDW RBC AUTO: 46.5 FL (ref 37–54)
EOSINOPHIL # BLD AUTO: 0.01 10*3/MM3 (ref 0–0.7)
EOSINOPHIL NFR BLD AUTO: 0.1 % (ref 0.3–6.2)
ERYTHROCYTE [DISTWIDTH] IN BLOOD BY AUTOMATED COUNT: 15.4 % (ref 11.7–13)
GFR SERPL CREATININE-BSD FRML MDRD: 55 ML/MIN/1.73
GLUCOSE BLD-MCNC: 332 MG/DL (ref 65–99)
GLUCOSE BLDC GLUCOMTR-MCNC: 179 MG/DL (ref 70–130)
GLUCOSE BLDC GLUCOMTR-MCNC: 224 MG/DL (ref 70–130)
GLUCOSE BLDC GLUCOMTR-MCNC: 239 MG/DL (ref 70–130)
GLUCOSE BLDC GLUCOMTR-MCNC: 297 MG/DL (ref 70–130)
HCT VFR BLD AUTO: 30.9 % (ref 35.6–45.5)
HGB BLD-MCNC: 9.7 G/DL (ref 11.9–15.5)
IMM GRANULOCYTES # BLD: 0.02 10*3/MM3 (ref 0–0.03)
IMM GRANULOCYTES NFR BLD: 0.2 % (ref 0–0.5)
INR PPP: 1.47 (ref 0.9–1.1)
LYMPHOCYTES # BLD AUTO: 1.77 10*3/MM3 (ref 0.9–4.8)
LYMPHOCYTES NFR BLD AUTO: 17.2 % (ref 19.6–45.3)
MCH RBC QN AUTO: 26 PG (ref 26.9–32)
MCHC RBC AUTO-ENTMCNC: 31.4 G/DL (ref 32.4–36.3)
MCV RBC AUTO: 82.8 FL (ref 80.5–98.2)
MONOCYTES # BLD AUTO: 1.03 10*3/MM3 (ref 0.2–1.2)
MONOCYTES NFR BLD AUTO: 10 % (ref 5–12)
NEUTROPHILS # BLD AUTO: 7.45 10*3/MM3 (ref 1.9–8.1)
NEUTROPHILS NFR BLD AUTO: 72.2 % (ref 42.7–76)
PLATELET # BLD AUTO: 236 10*3/MM3 (ref 140–500)
PMV BLD AUTO: 10.3 FL (ref 6–12)
POTASSIUM BLD-SCNC: 4.1 MMOL/L (ref 3.5–5.2)
PROTHROMBIN TIME: 17.3 SECONDS (ref 11.7–14.2)
RBC # BLD AUTO: 3.73 10*6/MM3 (ref 3.9–5.2)
SODIUM BLD-SCNC: 132 MMOL/L (ref 136–145)
WBC NRBC COR # BLD: 10.31 10*3/MM3 (ref 4.5–10.7)

## 2017-03-22 PROCEDURE — 82962 GLUCOSE BLOOD TEST: CPT

## 2017-03-22 PROCEDURE — 85025 COMPLETE CBC W/AUTO DIFF WBC: CPT | Performed by: INTERNAL MEDICINE

## 2017-03-22 PROCEDURE — 94799 UNLISTED PULMONARY SVC/PX: CPT

## 2017-03-22 PROCEDURE — 63710000001 INSULIN ASPART PER 5 UNITS: Performed by: INTERNAL MEDICINE

## 2017-03-22 PROCEDURE — 97110 THERAPEUTIC EXERCISES: CPT

## 2017-03-22 PROCEDURE — 63710000001 INSULIN DETEMER PER 5 UNITS: Performed by: INTERNAL MEDICINE

## 2017-03-22 PROCEDURE — 97150 GROUP THERAPEUTIC PROCEDURES: CPT

## 2017-03-22 PROCEDURE — 85610 PROTHROMBIN TIME: CPT | Performed by: ORTHOPAEDIC SURGERY

## 2017-03-22 PROCEDURE — 99024 POSTOP FOLLOW-UP VISIT: CPT | Performed by: ORTHOPAEDIC SURGERY

## 2017-03-22 PROCEDURE — 80048 BASIC METABOLIC PNL TOTAL CA: CPT | Performed by: INTERNAL MEDICINE

## 2017-03-22 RX ORDER — OXYCODONE AND ACETAMINOPHEN 7.5; 325 MG/1; MG/1
1 TABLET ORAL EVERY 4 HOURS PRN
Status: DISCONTINUED | OUTPATIENT
Start: 2017-03-22 | End: 2017-03-23 | Stop reason: HOSPADM

## 2017-03-22 RX ORDER — OXYCODONE AND ACETAMINOPHEN 7.5; 325 MG/1; MG/1
2 TABLET ORAL EVERY 4 HOURS PRN
Status: DISCONTINUED | OUTPATIENT
Start: 2017-03-22 | End: 2017-03-23 | Stop reason: HOSPADM

## 2017-03-22 RX ADMIN — INSULIN ASPART 5 UNITS: 100 INJECTION, SOLUTION INTRAVENOUS; SUBCUTANEOUS at 07:58

## 2017-03-22 RX ADMIN — POTASSIUM CHLORIDE 10 MEQ: 750 CAPSULE, EXTENDED RELEASE ORAL at 07:58

## 2017-03-22 RX ADMIN — INSULIN ASPART 5 UNITS: 100 INJECTION, SOLUTION INTRAVENOUS; SUBCUTANEOUS at 12:55

## 2017-03-22 RX ADMIN — INSULIN ASPART 2 UNITS: 100 INJECTION, SOLUTION INTRAVENOUS; SUBCUTANEOUS at 21:00

## 2017-03-22 RX ADMIN — OXYCODONE HYDROCHLORIDE AND ACETAMINOPHEN 2 TABLET: 7.5; 325 TABLET ORAL at 13:50

## 2017-03-22 RX ADMIN — CETIRIZINE HYDROCHLORIDE 5 MG: 10 TABLET, FILM COATED ORAL at 08:01

## 2017-03-22 RX ADMIN — CLINDAMYCIN PHOSPHATE 900 MG: 18 INJECTION, SOLUTION INTRAMUSCULAR; INTRAVENOUS at 00:00

## 2017-03-22 RX ADMIN — MONTELUKAST 10 MG: 10 TABLET, FILM COATED ORAL at 21:00

## 2017-03-22 RX ADMIN — OXYCODONE HYDROCHLORIDE AND ACETAMINOPHEN 1 TABLET: 7.5; 325 TABLET ORAL at 23:39

## 2017-03-22 RX ADMIN — OXYCODONE HYDROCHLORIDE AND ACETAMINOPHEN 2 TABLET: 5; 325 TABLET ORAL at 00:05

## 2017-03-22 RX ADMIN — DOCUSATE SODIUM,SENNOSIDES 2 TABLET: 50; 8.6 TABLET, FILM COATED ORAL at 18:04

## 2017-03-22 RX ADMIN — POTASSIUM CHLORIDE 10 MEQ: 750 CAPSULE, EXTENDED RELEASE ORAL at 18:04

## 2017-03-22 RX ADMIN — OXYCODONE HYDROCHLORIDE AND ACETAMINOPHEN 2 TABLET: 7.5; 325 TABLET ORAL at 19:27

## 2017-03-22 RX ADMIN — SERTRALINE 50 MG: 50 TABLET, FILM COATED ORAL at 08:00

## 2017-03-22 RX ADMIN — INSULIN DETEMIR 25 UNITS: 100 INJECTION, SOLUTION SUBCUTANEOUS at 09:08

## 2017-03-22 RX ADMIN — INSULIN ASPART 4 UNITS: 100 INJECTION, SOLUTION INTRAVENOUS; SUBCUTANEOUS at 18:00

## 2017-03-22 RX ADMIN — OXYCODONE HYDROCHLORIDE AND ACETAMINOPHEN 2 TABLET: 5; 325 TABLET ORAL at 04:10

## 2017-03-22 RX ADMIN — DOCUSATE SODIUM,SENNOSIDES 2 TABLET: 50; 8.6 TABLET, FILM COATED ORAL at 08:01

## 2017-03-22 RX ADMIN — OXYCODONE HYDROCHLORIDE AND ACETAMINOPHEN 2 TABLET: 5; 325 TABLET ORAL at 09:08

## 2017-03-22 RX ADMIN — RIVAROXABAN 10 MG: 10 TABLET, FILM COATED ORAL at 18:04

## 2017-03-22 NOTE — PROGRESS NOTES
Orthopedic Progress Note      Patient: Monie Mccoy    YOB: 1950    Medical Record Number: [id number]    Attending Physician: Lalit Birch MD    Date of Admission: 3/21/2017  6:24 AM    Status Post: TOTAL HIP ARTHROPLASTY ANTERIOR WITH HANA TABLE    Post Operative Day Number: 1    Admitting Dx: Osteoarthritis of one hip, right [M16.11]  Primary osteoarthritis of right hip [M16.11]    Current Problem List:   Patient Active Problem List   Diagnosis   • History of total knee arthroplasty   • Right hip pain   • Primary osteoarthritis of right hip         Past Medical History:   Diagnosis Date   • Anxiety and depression    • Arthritis     OSTEO   • Cataract     BEGINNING   • Diabetes mellitus     TYPE 2   • High cholesterol    • History of colitis    • History of coronary artery disease    • History of diverticulitis    • History of [place] spotted fever     2 YR AGO   • History of transfusion 1978    S/P DELIVERY OF SON   • Hypertension    • Old MI (myocardial infarction) 1999   • Right hip pain    • Seasonal allergies        Current Medications:  Scheduled Meds:  cetirizine 5 mg Oral Daily   insulin aspart 0-7 Units Subcutaneous 4x Daily AC & at Bedtime   insulin detemir 25 Units Subcutaneous Q12H   metoprolol succinate XL 12.5 mg Oral Daily   montelukast 10 mg Oral Nightly   potassium chloride 10 mEq Oral BID With Meals   rivaroxaban 10 mg Oral Daily With Dinner   sennosides-docusate sodium 2 tablet Oral BID   sertraline 50 mg Oral Daily     PRN Meds:.•  acetaminophen  •  bisacodyl  •  dextrose  •  dextrose  •  diphenhydrAMINE  •  glucagon (human recombinant)  •  HYDROmorphone  •  magnesium hydroxide  •  melatonin  •  ondansetron **OR** ondansetron ODT **OR** ondansetron  •  oxyCODONE-acetaminophen  •  oxyCODONE-acetaminophen  •  sodium chloride    Diagnostic Tests:    Lab Results (last 24 hours)     Procedure Component Value Units Date/Time    POC Glucose Fingerstick [[id number]]  (Normal)  Collected:  03/21/17 1114    Specimen:  Blood Updated:  03/21/17 1117     Glucose 87 mg/dL     Narrative:       Meter: [id number] : [id number] Tutu Castrejon    Protime-INR [[id number]]  (Abnormal) Collected:  03/21/17 1455    Specimen:  Blood Updated:  03/21/17 1535     Protime 13.9 Seconds      INR 1.11 (H)    POC Glucose Fingerstick [[id number]]  (Abnormal) Collected:  03/21/17 1623    Specimen:  Blood Updated:  03/21/17 1624     Glucose 139 (H) mg/dL     Narrative:       Meter: [id number] : [id number] Carly Jeffery    POC Glucose Fingerstick [[id number]]  (Abnormal) Collected:  03/21/17 2113    Specimen:  Blood Updated:  03/21/17 2114     Glucose 268 (H) mg/dL     Narrative:       Meter: [id number] : [id number] Jorge CLEANING    CBC & Differential [[id number]] Collected:  03/22/17 0438    Specimen:  Blood Updated:  03/22/17 0546    Narrative:       The following orders were created for panel order CBC & Differential.  Procedure                               Abnormality         Status                     ---------                               -----------         ------                     CBC Auto Differential[[id number]]         Abnormal            Final result                 Please view results for these tests on the individual orders.    CBC Auto Differential [[id number]]  (Abnormal) Collected:  03/22/17 0438    Specimen:  Blood Updated:  03/22/17 0546     WBC 10.31 10*3/mm3      RBC 3.73 (L) 10*6/mm3      Hemoglobin 9.7 (L) g/dL      Hematocrit 30.9 (L) %      MCV 82.8 fL      MCH 26.0 (L) pg      MCHC 31.4 (L) g/dL      RDW 15.4 (H) %      RDW-SD 46.5 fl      MPV 10.3 fL      Platelets 236 10*3/mm3      Neutrophil % 72.2 %      Lymphocyte % 17.2 (L) %      Monocyte % 10.0 %      Eosinophil % 0.1 (L) %      Basophil % 0.3 %      Immature Grans % 0.2 %      Neutrophils, Absolute 7.45 10*3/mm3      Lymphocytes, Absolute 1.77 10*3/mm3      Monocytes, Absolute 1.03 10*3/mm3      Eosinophils, Absolute 0.01  10*3/mm3      Basophils, Absolute 0.03 10*3/mm3      Immature Grans, Absolute 0.02 10*3/mm3     Protime-INR [[id number]]  (Abnormal) Collected:  03/22/17 0438    Specimen:  Blood Updated:  03/22/17 0556     Protime 17.3 (H) Seconds      INR 1.47 (H)    Basic Metabolic Panel [[id number]]  (Abnormal) Collected:  03/22/17 0438    Specimen:  Blood Updated:  03/22/17 0627     Glucose 332 (H) mg/dL      BUN 20 mg/dL      Creatinine 1.01 (H) mg/dL      Sodium 132 (L) mmol/L      Potassium 4.1 mmol/L      Chloride 94 (L) mmol/L      CO2 21.8 (L) mmol/L      Calcium 8.1 (L) mg/dL      eGFR Non African Amer 55 (L) mL/min/1.73      BUN/Creatinine Ratio 19.8     Anion Gap 16.2 mmol/L     Narrative:       GFR Normal >60  Chronic Kidney Disease <60  Kidney Failure <15    POC Glucose Fingerstick [[id number]]  (Abnormal) Collected:  03/22/17 0618    Specimen:  Blood Updated:  03/22/17 0737     Glucose 297 (H) mg/dL     Narrative:       Meter: [id number] : [id number] Jorge CLEANING          Objective:  General Appearance:  67 y.o. female . Color pale.  Awake and alert. No complaints    Assessment:  Physical Exam:  Right anterior hip dressing dry and intact. Calf soft and nontender.  Good motion and sensation to right foot and ankle.  Moderate pain.  Will adjust meds.  Blood sugar elevated.      Vitals:    03/21/17 1900 03/21/17 2300 03/22/17 0300 03/22/17 0700   BP: 129/71 118/68 (!) 86/56 120/70   BP Location: Right arm Left arm Right arm Right arm   Patient Position: Lying Lying Lying Lying   Pulse: 87 102 92 100   Resp: 16 16 16 16   Temp: 98.4 °F (36.9 °C) 98.8 °F (37.1 °C) 98.7 °F (37.1 °C) 97 °F (36.1 °C)   TempSrc: Oral Oral Oral Oral   SpO2: 98% 96% 96% 98%   Weight:       Height:           Plan:    Continue Pain management efforts  Continue mobilization efforts  Continue incisional care  Continue DVT Prophylaxis    Discharge Plan:  Plan home tomorrow with home health if medically stable.    Date: 3/22/2017    Bernie  Jim, RN

## 2017-03-22 NOTE — PROGRESS NOTES
LOS: 1 day   Patient Care Team:  ALEX Doherty as PCP - General (Internal Medicine)    No chief complaint on file.        Subjective   Just got back from PT & having a lot of pain right now. Feels like upper thigh is swollen. Having pain across groin as well as done ant thigh to knee.   Has had some nausea. Not eating partly because she doesn't like the food      Objective     Vital Signs  Temp:  [97 °F (36.1 °C)-98.8 °F (37.1 °C)] 97.4 °F (36.3 °C)  Heart Rate:  [] 99  Resp:  [16] 16  BP: ()/(56-71) 115/63    Physical Exam:  WDWN WF in NAD  Skin warm & dry  Lungs clear heart RRR  Abd soft, NT, BS+  Ext trace edema     Results Review:     I reviewed the patient's new clinical results.    Medication Review:       LABS    Results from last 7 days  Lab Units 03/22/17  0438   SODIUM mmol/L 132*   POTASSIUM mmol/L 4.1   CHLORIDE mmol/L 94*   TOTAL CO2 mmol/L 21.8*   BUN mg/dL 20   CREATININE mg/dL 1.01*   GLUCOSE mg/dL 332*   CALCIUM mg/dL 8.1*       Results from last 7 days  Lab Units 03/22/17  0438   WBC 10*3/mm3 10.31   HEMOGLOBIN g/dL 9.7*   HEMATOCRIT % 30.9*   PLATELETS 10*3/mm3 236       Results from last 7 days  Lab Units 03/22/17  0438 03/21/17  1455   INR  1.47* 1.11*           Assessment/Plan     Active Problems:    Primary osteoarthritis of right hip    2. DM2 - will increase levemir a little more to get BS down better. Pt reports that she was having problems with hypoglycemia overnight  3. Postop hypotension - BP better but holding lisinopril & HCTZ & dose of metoprolol is lowered. Cont same for now  4. CAD - currently stable  5. HTN - as above, current BPs low & meds being held. Will plan on having home health to monitor BP after d/c.          Rabia Wiggins MD  03/22/17  4:59 PM      Time:

## 2017-03-22 NOTE — PLAN OF CARE
Problem: Patient Care Overview (Adult)  Goal: Plan of Care Review    03/22/17 1107   Coping/Psychosocial Response Interventions   Plan Of Care Reviewed With patient   Patient Care Overview   Progress improving   Outcome Evaluation   Outcome Summary/Follow up Plan Improved tolerance to functional activity this AM with an increase in gait distance, progression of ther. ex. protocol, and decreased assist required for overall functional mobility.

## 2017-03-22 NOTE — PROGRESS NOTES
Discharge Planning Assessment  [facility]     Patient Name: Monie Mccoy  MRN: [id number]  Today's Date: 3/22/2017    Admit Date: 3/21/2017          Discharge Needs Assessment       03/22/17 1537    Living Environment    Lives With spouse    Living Arrangements house    Discharge Needs Assessment    Concerns To Be Addressed basic needs concerns    Readmission Within The Last 30 Days no previous admission in last 30 days    Equipment Currently Used at Home none    Equipment Needed After Discharge none    Discharge Facility/Level Of Care Needs home with home health    Transportation Available car;family or friend will provide            Discharge Plan       03/22/17 1545    Case Management/Social Work Plan    [facility]    Patient/Family In Agreement With Plan yes    Additional Comments Spoke with patient, verified correct information on facesheet and explained the role of CCP. Pt would like to d/c home with [facility], referral given to [facility] who states they are able to accept. Plan will be to d/c home with HH and family support.        Discharge Placement     Facility/Agency Request Status Selected? Address Phone Number Fax Number    [facility] Accepted    Yes [id number] [address] [address] [phone]            Judi Islas RN

## 2017-03-22 NOTE — PROGRESS NOTES
Acute Care - Physical Therapy Treatment Note  [facility]     Patient Name: Monie Mccoy  : 1950  MRN: [id number]  Today's Date: 3/22/2017  Onset of Illness/Injury or Date of Surgery Date: 17          Admit Date: 3/21/2017    Visit Dx:    ICD-10-CM ICD-9-CM   1. Primary osteoarthritis of right hip M16.11 715.15   2. Osteoarthritis of one hip, right M16.11 715.95     Patient Active Problem List   Diagnosis   • History of total knee arthroplasty   • Right hip pain   • Primary osteoarthritis of right hip               Adult Rehabilitation Note       17 1104          Rehab Assessment/Intervention    Discipline physical therapist  -MS      Document Type therapy note (daily note)  -MS      Subjective Information agree to therapy;complains of;weakness;fatigue;pain  -MS      Patient Effort, Rehab Treatment good  -MS      Symptoms Noted During/After Treatment fatigue;increased pain  -MS      Precautions/Limitations fall precautions;anterior hip precautions- right  -MS      Equipment Issued to Patient --   Pt. already has RWX for home use.  -MS      Recorded by [MS] Victor Hugo Can, PT      Pain Assessment    Pain Assessment 0-10  -MS      Pain Score 7  -MS      Post Pain Score 7  -MS      Pain Type Acute pain;Surgical pain  -MS      Pain Location Hip  -MS      Pain Orientation Right  -MS      Pain Intervention(s) Medication (See MAR);Cold applied;Repositioned;Elevated;Rest  -MS      Recorded by [MS] Victor Hugo Can, PT      Cognitive Assessment/Intervention    Current Cognitive/Communication Assessment functional  -MS      Orientation Status oriented x 4  -MS      Follows Commands/Answers Questions 100% of the time  -MS      Personal Safety WNL/WFL  -MS      Personal Safety Interventions fall prevention program maintained;gait belt;nonskid shoes/slippers when out of bed;supervised activity  -MS      Recorded by [MS] Victor Hugo Can, PT      Bed Mobility, Assessment/Treatment    Bed Mobility, Comment  Up in chair this AM.  -MS      Recorded by [MS] Victor Hugo Can, PT      Transfer Assessment/Treatment    Transfers, Sit-Stand [place] contact guard assist  -MS      Transfers, Stand-Sit [place] contact guard assist  -MS      Transfers, Sit-Stand-Sit, Assist Device rolling walker  -MS      Recorded by [MS] Victor Hugo Can, PT      Gait Assessment/Treatment    Gait, [place] Level contact guard assist  -MS      Gait, Assistive Device rolling walker  -MS      Gait, Distance (Feet) 90  -MS      Gait, Gait Deviations right:;antalgic;hoa decreased;decreased heel strike;forward flexed posture  -MS      Gait, Comment Verbal/tactile cues required for posture correction and to increase her Bilateral heel strike during gait cycle  -MS      Recorded by [MS] Victor Hugo Can PT      Therapy Exercises    Exercise Protocols total hip  -MS      Total Hip Exercises right:;15 reps;completed protocol  -MS      Recorded by [MS] Victor Hugo Can PT      Positioning and Restraints    Pre-Treatment Position sitting in chair/recliner  -MS      Post Treatment Position chair  -MS      In Chair notified nsg;reclined;sitting;call light within reach;encouraged to call for assist;with family/caregiver   Ice pack to Right hip  -MS      Recorded by [MS] Victor Hugo Can, PT        User Key  (r) = Recorded By, (t) = Taken By, (c) = Cosigned By    Initials Name Effective Dates    MS Victor Hugo Can, PT 12/01/15 -                 IP PT Goals       03/21/17 1450          Bed Mobility PT LTG    Bed Mobility PT LTG, Date Established 03/21/17  -AR      Bed Mobility PT LTG, Time to Achieve 1 wk  -AR      Bed Mobility PT LTG, Activity Type supine to sit/sit to supine  -AR      Bed Mobility PT LTG, [place] Level supervision required  -AR      Transfer Training PT LTG    Transfer Training PT LTG, Date Established 03/21/17  -AR      Transfer Training PT LTG, Time to Achieve 1 wk  -AR      Transfer Training PT LTG, Activity Type  sit to stand/stand to sit;bed to chair /chair to bed  -AR      Transfer Training PT LTG, [place] Level supervision required  -AR      Transfer Training PT LTG, Assist Device walker, rolling  -AR      Gait Training PT LTG    Gait Training Goal PT LTG, Date Established 03/21/17  -AR      Gait Training Goal PT LTG, Time to Achieve 1 wk  -AR      Gait Training Goal PT LTG, [place] Level supervision required  -AR      Gait Training Goal PT LTG, Assist Device walker, rolling  -AR      Gait Training Goal PT LTG, Distance to Achieve 150  -AR        User Key  (r) = Recorded By, (t) = Taken By, (c) = Cosigned By    Initials Name Provider Type    AR Jeniffer Dorman, PT Physical Therapist          Physical Therapy Education     Title: PT OT SLP Therapies (Done)     Topic: Physical Therapy (Done)     Point: Mobility training (Done)    Learning Progress Summary    Learner Readiness Method Response Comment Documented by Status   Patient Acceptance ELIAS GUERRA NR  MS 03/22/17 1106 Done    Acceptance E VU  AR 03/21/17 1451 Done               Point: Home exercise program (Done)    Learning Progress Summary    Learner Readiness Method Response Comment Documented by Status   Patient Acceptance ELIAS GUERRA NR  MS 03/22/17 1106 Done    Acceptance E VU  AR 03/21/17 1451 Done               Point: Body mechanics (Done)    Learning Progress Summary    Learner Readiness Method Response Comment Documented by Status   Patient Acceptance ELIAS GUERRA NR  MS 03/22/17 1106 Done    Acceptance E VU  AR 03/21/17 1451 Done               Point: Precautions (Done)    Learning Progress Summary    Learner Readiness Method Response Comment Documented by Status   Patient Acceptance ELIAS GUERRA NR  MS 03/22/17 1106 Done    Acceptance E VU  AR 03/21/17 1451 Done                      User Key     Initials Effective Dates Name Provider Type Discipline    MS 12/01/15 -  Victor Hugo Can, PT Physical Therapist PT    AR 06/27/16 -  Jeniffer Dorman, PT Physical Therapist PT                     PT Recommendation and Plan  Anticipated Discharge Disposition: home with assist, home with home health  Planned Therapy Interventions: balance training, bed mobility training, gait training, home exercise program, patient/family education, ROM (Range of Motion), stair training, strengthening  PT Frequency: 2 times/day  Plan of Care Review  Plan Of Care Reviewed With: patient  Progress: improving  Outcome Summary/Follow up Plan: Improved tolerance to functional activity this AM with an increase in gait distance, progression of ther. ex. protocol, and decreased assist required for overall functional mobility.          Outcome Measures       03/22/17 1100 03/21/17 1400       How much help from another person do you currently need...    Turning from your back to your side while in flat bed without using bedrails? 4  -MS 4  -AR     Moving from lying on back to sitting on the side of a flat bed without bedrails? 4  -MS 4  -AR     Moving to and from a bed to a chair (including a wheelchair)? 3  -MS 3  -AR     Standing up from a chair using your arms (e.g., wheelchair, bedside chair)? 3  -MS 3  -AR     Climbing 3-5 steps with a railing? 3  -MS 3  -AR     To walk in hospital room? 3  -MS 3  -AR     AM-PAC 6 Clicks Score 20  -MS 20  -AR     Functional Assessment    Outcome Measure Options AM-PAC 6 Clicks Basic Mobility (PT)  -MS AM-PAC 6 Clicks Basic Mobility (PT)  -AR       User Key  (r) = Recorded By, (t) = Taken By, (c) = Cosigned By    Initials Name Provider Type    MS Victor Hugo Can, PT Physical Therapist    GIOVANNI Dorman PT Physical Therapist           Time Calculation:         PT Charges       03/22/17 1107          Time Calculation    Start Time 0930  -MS      Stop Time 0959  -MS      Time Calculation (min) 29 min  -MS      PT Received On 03/22/17  -MS      PT - Next Appointment 03/22/17  -MS        User Key  (r) = Recorded By, (t) = Taken By, (c) = Cosigned By    Initials Name Provider Type     MS Victor Hugo Can, PT Physical Therapist          Therapy Charges for Today     Code Description Service Date Service Provider Modifiers Qty    62089439416 HC PT THER PROC EA 15 MIN 3/22/2017 Victor Hugo Can, PT GP 1    49788222525 HC PT THER PROC GROUP 3/22/2017 Victor Hugo Can, PT GP 1          PT G-Codes  Outcome Measure Options: AM-PAC 6 Clicks Basic Mobility (PT)    Victor Hugo Can, PT  3/22/2017

## 2017-03-22 NOTE — PLAN OF CARE
Problem: Patient Care Overview (Adult)  Goal: Plan of Care Review  Outcome: Ongoing (interventions implemented as appropriate)    03/22/17 1545 03/22/17 3758   Coping/Psychosocial Response Interventions   Plan Of Care Reviewed With --  patient   Patient Care Overview   Progress --  improving   Outcome Evaluation   Outcome Summary/Follow up Plan Pt. limited in overall gait distance this PM due to an increase in Right hip pain/soreness. Pt. was able to tolerate progression of ther. ex. protocol despite the pain/soreness however. --        Goal: Adult Individualization and Mutuality  Outcome: Ongoing (interventions implemented as appropriate)    Problem: Perioperative Period (Adult)  Goal: Signs and Symptoms of Listed Potential Problems Will be Absent or Manageable (Perioperative Period)  Outcome: Ongoing (interventions implemented as appropriate)  Goal: Signs and Symptoms of Listed Potential Problems Will be Absent or Manageable (Perioperative Period)  Outcome: Ongoing (interventions implemented as appropriate)    Problem: Hip Replacement, Total (Adult)  Goal: Signs and Symptoms of Listed Potential Problems Will be Absent or Manageable (Hip Replacement, Total)  Outcome: Ongoing (interventions implemented as appropriate)    Problem: Pain, Acute (Adult)  Goal: Identify Related Risk Factors and Signs and Symptoms  Outcome: Ongoing (interventions implemented as appropriate)  Goal: Acceptable Pain Control/Comfort Level  Outcome: Ongoing (interventions implemented as appropriate)

## 2017-03-22 NOTE — PROGRESS NOTES
Acute Care - Physical Therapy Treatment Note  [facility]     Patient Name: Monie Mccoy  : 1950  MRN: [id number]  Today's Date: 3/22/2017  Onset of Illness/Injury or Date of Surgery Date: 17          Admit Date: 3/21/2017    Visit Dx:    ICD-10-CM ICD-9-CM   1. Primary osteoarthritis of right hip M16.11 715.15   2. Osteoarthritis of one hip, right M16.11 715.95     Patient Active Problem List   Diagnosis   • History of total knee arthroplasty   • Right hip pain   • Primary osteoarthritis of right hip               Adult Rehabilitation Note       17 1539 17 1104       Rehab Assessment/Intervention    Discipline physical therapist  -MS physical therapist  -MS     Document Type therapy note (daily note)  -MS therapy note (daily note)  -MS     Subjective Information agree to therapy;complains of;weakness;fatigue;pain  -MS agree to therapy;complains of;weakness;fatigue;pain  -MS     Patient Effort, Rehab Treatment good  -MS good  -MS     Symptoms Noted During/After Treatment fatigue;increased pain  -MS fatigue;increased pain  -MS     Symptoms Noted Comment Pt. reports increased pain/soreness in her Right hip this PM.  -MS      Precautions/Limitations fall precautions;anterior hip precautions- right  -MS fall precautions;anterior hip precautions- right  -MS     Equipment Issued to Patient  --   Pt. already has RWX for home use.  -MS     Recorded by [MS] Victor Hugo Can, PT [MS] Victor Hugo Can, PT     Pain Assessment    Pain Assessment 0-10  -MS 0-10  -MS     Pain Score 9  -MS 7  -MS     Post Pain Score 9  -MS 7  -MS     Pain Type Acute pain;Surgical pain  -MS Acute pain;Surgical pain  -MS     Pain Location Hip  -MS Hip  -MS     Pain Orientation Right  -MS Right  -MS     Pain Intervention(s) Medication (See MAR);Cold applied;Repositioned;Elevated;Rest  -MS Medication (See MAR);Cold applied;Repositioned;Elevated;Rest  -MS     Recorded by [MS] Victor Hugo Can, PT [MS] Victor Hugo Can, PT      Cognitive Assessment/Intervention    Current Cognitive/Communication Assessment  functional  -MS     Orientation Status  oriented x 4  -MS     Follows Commands/Answers Questions  100% of the time  -MS     Personal Safety  WNL/WFL  -MS     Personal Safety Interventions  fall prevention program maintained;gait belt;nonskid shoes/slippers when out of bed;supervised activity  -MS     Recorded by  [MS] Victor Hugo Can PT     Bed Mobility, Assessment/Treatment    Bed Mobility, Comment Up in chair this PM.  -MS Up in chair this AM.  -MS     Recorded by [MS] Victor Hugo Can PT [MS] Victor Hugo Can PT     Transfer Assessment/Treatment    Transfers, Sit-Stand [place] contact guard assist  -MS contact guard assist  -MS     Transfers, Stand-Sit [place] contact guard assist  -MS contact guard assist  -MS     Transfers, Sit-Stand-Sit, Assist Device rolling walker  -MS rolling walker  -MS     Recorded by [MS] Victor Hugo Can PT [MS] Victor Hugo Can PT     Gait Assessment/Treatment    Gait, [place] Level contact guard assist  -MS contact guard assist  -MS     Gait, Assistive Device rolling walker  -MS rolling walker  -MS     Gait, Distance (Feet) 55  -MS 90  -MS     Gait, Gait Deviations right:;antalgic;hoa decreased;forward flexed posture  -MS right:;antalgic;hoa decreased;decreased heel strike;forward flexed posture  -MS     Gait, Comment Pt. limited in gait distance this PM due to increase in pain/soreness.  Verbal/tactile cues required for posture correction.  -MS Verbal/tactile cues required for posture correction and to increase her Bilateral heel strike during gait cycle  -MS     Recorded by [MS] Victor Hugo Can PT [MS] Victor Hugo Can PT     Therapy Exercises    Exercise Protocols total hip  -MS total hip  -MS     Total Hip Exercises right:;20 reps;completed protocol  -MS right:;15 reps;completed protocol  -MS     Recorded by [MS] Victor Hugo Can PT [MS] Victor Hugo Can PT      Positioning and Restraints    Pre-Treatment Position sitting in chair/recliner  -MS sitting in chair/recliner  -MS     Post Treatment Position chair  -MS chair  -MS     In Chair notified nsg;reclined;sitting;call light within reach;encouraged to call for assist   Ice pack to Right hip.  -MS notified nsg;reclined;sitting;call light within reach;encouraged to call for assist;with family/caregiver   Ice pack to Right hip  -MS     Recorded by [MS] Victor Hugo Can, PT [MS] Victor Hugo Can, PT       User Key  (r) = Recorded By, (t) = Taken By, (c) = Cosigned By    Initials Name Effective Dates    MS Victor Hugo PAZ Pamella, PT 12/01/15 -                 IP PT Goals       03/21/17 1450          Bed Mobility PT LTG    Bed Mobility PT LTG, Date Established 03/21/17  -AR      Bed Mobility PT LTG, Time to Achieve 1 wk  -AR      Bed Mobility PT LTG, Activity Type supine to sit/sit to supine  -AR      Bed Mobility PT LTG, [place] Level supervision required  -AR      Transfer Training PT LTG    Transfer Training PT LTG, Date Established 03/21/17  -AR      Transfer Training PT LTG, Time to Achieve 1 wk  -AR      Transfer Training PT LTG, Activity Type sit to stand/stand to sit;bed to chair /chair to bed  -AR      Transfer Training PT LTG, [place] Level supervision required  -AR      Transfer Training PT LTG, Assist Device walker, rolling  -AR      Gait Training PT LTG    Gait Training Goal PT LTG, Date Established 03/21/17  -AR      Gait Training Goal PT LTG, Time to Achieve 1 wk  -AR      Gait Training Goal PT LTG, [place] Level supervision required  -AR      Gait Training Goal PT LTG, Assist Device walker, rolling  -AR      Gait Training Goal PT LTG, Distance to Achieve 150  -AR        User Key  (r) = Recorded By, (t) = Taken By, (c) = Cosigned By    Initials Name Provider Type    GIOVANNI Dorman PT Physical Therapist          Physical Therapy Education     Title: PT OT SLP Therapies (Done)     Topic: Physical  Therapy (Done)     Point: Mobility training (Done)    Learning Progress Summary    Learner Readiness Method Response Comment Documented by Status   Patient Acceptance E,D VU,NR  MS 03/22/17 1106 Done    Acceptance E VU  AR 03/21/17 1451 Done               Point: Home exercise program (Done)    Learning Progress Summary    Learner Readiness Method Response Comment Documented by Status   Patient Acceptance ELIAS GUERRA VU,NR  MS 03/22/17 1106 Done    Acceptance E VU  AR 03/21/17 1451 Done               Point: Body mechanics (Done)    Learning Progress Summary    Learner Readiness Method Response Comment Documented by Status   Patient Acceptance E,D VU,NR  MS 03/22/17 1106 Done    Acceptance E VU  AR 03/21/17 1451 Done               Point: Precautions (Done)    Learning Progress Summary    Learner Readiness Method Response Comment Documented by Status   Patient Acceptance ELIAS GUERRA,NR  MS 03/22/17 1106 Done    Acceptance E VU  AR 03/21/17 1451 Done                      User Key     Initials Effective Dates Name Provider Type Discipline    MS 12/01/15 -  Victor Hugo Can, PT Physical Therapist PT    AR 06/27/16 -  Jeniffer Dorman PT Physical Therapist PT                    PT Recommendation and Plan  Anticipated Discharge Disposition: home with assist, home with home health  Planned Therapy Interventions: balance training, bed mobility training, gait training, home exercise program, patient/family education, ROM (Range of Motion), stair training, strengthening  PT Frequency: 2 times/day  Plan of Care Review  Plan Of Care Reviewed With: patient  Progress: improving  Outcome Summary/Follow up Plan: Pt. limited in overall gait distance this PM due to an increase in Right hip pain/soreness.  Pt. was able to tolerate progression of ther. ex. protocol despite the pain/soreness however.          Outcome Measures       03/22/17 1500 03/22/17 1100 03/21/17 1400    How much help from another person do you currently need...    Turning from  your back to your side while in flat bed without using bedrails? 4  -MS 4  -MS 4  -AR    Moving from lying on back to sitting on the side of a flat bed without bedrails? 4  -MS 4  -MS 4  -AR    Moving to and from a bed to a chair (including a wheelchair)? 3  -MS 3  -MS 3  -AR    Standing up from a chair using your arms (e.g., wheelchair, bedside chair)? 3  -MS 3  -MS 3  -AR    Climbing 3-5 steps with a railing? 3  -MS 3  -MS 3  -AR    To walk in hospital room? 3  -MS 3  -MS 3  -AR    AM-PAC 6 Clicks Score 20  -MS 20  -MS 20  -AR    Functional Assessment    Outcome Measure Options AM-PAC 6 Clicks Basic Mobility (PT)  -MS AM-PAC 6 Clicks Basic Mobility (PT)  -MS AM-PAC 6 Clicks Basic Mobility (PT)  -AR      User Key  (r) = Recorded By, (t) = Taken By, (c) = Cosigned By    Initials Name Provider Type    MS Victor Hugo Can, PT Physical Therapist    AR Jeniffer Dorman PT Physical Therapist           Time Calculation:         PT Charges       03/22/17 1546 03/22/17 1107       Time Calculation    Start Time 1357  -MS 0930  -MS     Stop Time 1420  -MS 0959  -MS     Time Calculation (min) 23 min  -MS 29 min  -MS     PT Received On 03/22/17  -MS 03/22/17  -MS     PT - Next Appointment 03/23/17  -MS 03/22/17  -MS       User Key  (r) = Recorded By, (t) = Taken By, (c) = Cosigned By    Initials Name Provider Type    MS Victor Hugo JACKSON Can, PT Physical Therapist          Therapy Charges for Today     Code Description Service Date Service Provider Modifiers Qty    28631681141 HC PT THER PROC EA 15 MIN 3/22/2017 Victor Hugo Can, PT GP 1    96798123688 HC PT THER PROC GROUP 3/22/2017 Victor Hugo Can, PT GP 1    07176463591 HC PT THER PROC EA 15 MIN 3/22/2017 Victor Hugo Can PT GP 1    90812431458 HC PT THER PROC GROUP 3/22/2017 Victor Hugo Can, PT GP 1          PT G-Codes  Outcome Measure Options: AM-PAC 6 Clicks Basic Mobility (PT)    Victor Hugo Can, PT  3/22/2017

## 2017-03-22 NOTE — PLAN OF CARE
Problem: Patient Care Overview (Adult)  Goal: Plan of Care Review    03/22/17 1545   Coping/Psychosocial Response Interventions   Plan Of Care Reviewed With patient   Outcome Evaluation   Outcome Summary/Follow up Plan Pt. limited in overall gait distance this PM due to an increase in Right hip pain/soreness. Pt. was able to tolerate progression of ther. ex. protocol despite the pain/soreness however.

## 2017-03-23 VITALS
WEIGHT: 175.06 LBS | TEMPERATURE: 97.8 F | RESPIRATION RATE: 16 BRPM | OXYGEN SATURATION: 97 % | SYSTOLIC BLOOD PRESSURE: 110 MMHG | HEIGHT: 65 IN | HEART RATE: 97 BPM | DIASTOLIC BLOOD PRESSURE: 64 MMHG | BODY MASS INDEX: 29.17 KG/M2

## 2017-03-23 LAB
ANION GAP SERPL CALCULATED.3IONS-SCNC: 13.1 MMOL/L
BASOPHILS # BLD AUTO: 0.03 10*3/MM3 (ref 0–0.2)
BASOPHILS NFR BLD AUTO: 0.3 % (ref 0–1.5)
BUN BLD-MCNC: 19 MG/DL (ref 8–23)
BUN/CREAT SERPL: 19 (ref 7–25)
CALCIUM SPEC-SCNC: 8.8 MG/DL (ref 8.6–10.5)
CHLORIDE SERPL-SCNC: 94 MMOL/L (ref 98–107)
CO2 SERPL-SCNC: 24.9 MMOL/L (ref 22–29)
CREAT BLD-MCNC: 1 MG/DL (ref 0.57–1)
DEPRECATED RDW RBC AUTO: 47.4 FL (ref 37–54)
EOSINOPHIL # BLD AUTO: 0.05 10*3/MM3 (ref 0–0.7)
EOSINOPHIL NFR BLD AUTO: 0.5 % (ref 0.3–6.2)
ERYTHROCYTE [DISTWIDTH] IN BLOOD BY AUTOMATED COUNT: 15.5 % (ref 11.7–13)
GFR SERPL CREATININE-BSD FRML MDRD: 55 ML/MIN/1.73
GLUCOSE BLD-MCNC: 199 MG/DL (ref 65–99)
GLUCOSE BLDC GLUCOMTR-MCNC: 193 MG/DL (ref 70–130)
GLUCOSE BLDC GLUCOMTR-MCNC: 216 MG/DL (ref 70–130)
HCT VFR BLD AUTO: 32.3 % (ref 35.6–45.5)
HGB BLD-MCNC: 10.1 G/DL (ref 11.9–15.5)
IMM GRANULOCYTES # BLD: 0.03 10*3/MM3 (ref 0–0.03)
IMM GRANULOCYTES NFR BLD: 0.3 % (ref 0–0.5)
INR PPP: 1.58 (ref 0.9–1.1)
LYMPHOCYTES # BLD AUTO: 1.6 10*3/MM3 (ref 0.9–4.8)
LYMPHOCYTES NFR BLD AUTO: 14.8 % (ref 19.6–45.3)
MCH RBC QN AUTO: 26.2 PG (ref 26.9–32)
MCHC RBC AUTO-ENTMCNC: 31.3 G/DL (ref 32.4–36.3)
MCV RBC AUTO: 83.7 FL (ref 80.5–98.2)
MONOCYTES # BLD AUTO: 0.9 10*3/MM3 (ref 0.2–1.2)
MONOCYTES NFR BLD AUTO: 8.3 % (ref 5–12)
NEUTROPHILS # BLD AUTO: 8.2 10*3/MM3 (ref 1.9–8.1)
NEUTROPHILS NFR BLD AUTO: 75.8 % (ref 42.7–76)
PLATELET # BLD AUTO: 240 10*3/MM3 (ref 140–500)
PMV BLD AUTO: 10.3 FL (ref 6–12)
POTASSIUM BLD-SCNC: 4.5 MMOL/L (ref 3.5–5.2)
PROTHROMBIN TIME: 18.3 SECONDS (ref 11.7–14.2)
RBC # BLD AUTO: 3.86 10*6/MM3 (ref 3.9–5.2)
SODIUM BLD-SCNC: 132 MMOL/L (ref 136–145)
WBC NRBC COR # BLD: 10.81 10*3/MM3 (ref 4.5–10.7)

## 2017-03-23 PROCEDURE — 97110 THERAPEUTIC EXERCISES: CPT

## 2017-03-23 PROCEDURE — 99024 POSTOP FOLLOW-UP VISIT: CPT | Performed by: ORTHOPAEDIC SURGERY

## 2017-03-23 PROCEDURE — 82962 GLUCOSE BLOOD TEST: CPT

## 2017-03-23 PROCEDURE — 85025 COMPLETE CBC W/AUTO DIFF WBC: CPT | Performed by: INTERNAL MEDICINE

## 2017-03-23 PROCEDURE — 97150 GROUP THERAPEUTIC PROCEDURES: CPT

## 2017-03-23 PROCEDURE — 94799 UNLISTED PULMONARY SVC/PX: CPT

## 2017-03-23 PROCEDURE — 63710000001 INSULIN ASPART PER 5 UNITS: Performed by: INTERNAL MEDICINE

## 2017-03-23 PROCEDURE — 85610 PROTHROMBIN TIME: CPT | Performed by: ORTHOPAEDIC SURGERY

## 2017-03-23 PROCEDURE — 80048 BASIC METABOLIC PNL TOTAL CA: CPT | Performed by: INTERNAL MEDICINE

## 2017-03-23 RX ORDER — SENNA AND DOCUSATE SODIUM 50; 8.6 MG/1; MG/1
2 TABLET, FILM COATED ORAL 2 TIMES DAILY
Start: 2017-03-23

## 2017-03-23 RX ORDER — HYDROCHLOROTHIAZIDE 25 MG/1
25 TABLET ORAL EVERY MORNING
Start: 2017-03-23

## 2017-03-23 RX ORDER — LISINOPRIL 40 MG/1
40 TABLET ORAL NIGHTLY
Start: 2017-03-23

## 2017-03-23 RX ORDER — METOPROLOL SUCCINATE 50 MG/1
50 TABLET, EXTENDED RELEASE ORAL DAILY
Status: DISCONTINUED | OUTPATIENT
Start: 2017-03-23 | End: 2017-03-23 | Stop reason: HOSPADM

## 2017-03-23 RX ORDER — ASPIRIN 81 MG/1
81 TABLET, CHEWABLE ORAL DAILY
Start: 2017-03-23

## 2017-03-23 RX ORDER — OXYCODONE AND ACETAMINOPHEN 7.5; 325 MG/1; MG/1
TABLET ORAL
Qty: 60 TABLET | Refills: 0
Start: 2017-03-23 | End: 2017-03-31 | Stop reason: SINTOL

## 2017-03-23 RX ADMIN — CETIRIZINE HYDROCHLORIDE 5 MG: 10 TABLET, FILM COATED ORAL at 08:43

## 2017-03-23 RX ADMIN — POTASSIUM CHLORIDE 10 MEQ: 750 CAPSULE, EXTENDED RELEASE ORAL at 08:42

## 2017-03-23 RX ADMIN — OXYCODONE HYDROCHLORIDE AND ACETAMINOPHEN 2 TABLET: 7.5; 325 TABLET ORAL at 03:58

## 2017-03-23 RX ADMIN — OXYCODONE HYDROCHLORIDE AND ACETAMINOPHEN 2 TABLET: 7.5; 325 TABLET ORAL at 09:28

## 2017-03-23 RX ADMIN — METOPROLOL SUCCINATE 50 MG: 50 TABLET, FILM COATED, EXTENDED RELEASE ORAL at 09:28

## 2017-03-23 RX ADMIN — SERTRALINE 50 MG: 50 TABLET, FILM COATED ORAL at 08:42

## 2017-03-23 RX ADMIN — DOCUSATE SODIUM,SENNOSIDES 2 TABLET: 50; 8.6 TABLET, FILM COATED ORAL at 08:42

## 2017-03-23 RX ADMIN — INSULIN ASPART 4 UNITS: 100 INJECTION, SOLUTION INTRAVENOUS; SUBCUTANEOUS at 08:44

## 2017-03-23 NOTE — DISCHARGE SUMMARY
Date of Admission:  3/21/2017    Date of Discharge:  3/23/2017    Discharge Diagnosis: s/p Right Anterior Total Hip arthroplasty    Admitting Physician: Lalit Birch MD    Consults: MD Rudy Hughes MD    DETAILS OF HOSPITAL STAY:  Patient is a 67 y.o. female was admitted to the floor following a Anterior total hip arthroplasty.  Post-operatively the patient was transferred to the post-operative floor where the patient underwent physical therapy that included active as well as passive ROM exercises. Opioids were titrated to achieve appropriate pain management to allow for participation in mobilization exercises. Patient had postop hypotenstion and HCTZ and Lisinopril have been on hold.  Patient has continued her Metoprolol. Vital signs are now stable. The incision is benign without signs or symptoms of infection. Operative extremity neurovascular status remains intact. Appropriate education re: incision care, activity levels, medications, and follow up visits was completed and all questions were answered. The patient is now deemed stable for discharge to home.    Condition on Discharge:  Stable    Discharge Medications   Monie Mccoy   Home Medication Instructions HESHAM:[id number]    Printed on:03/23/17 1024   Medication Information                      acetaminophen (TYLENOL) 500 MG tablet  Take 1,000 mg by mouth Every 6 (Six) Hours As Needed for mild pain (1-3) or moderate pain (4-6).             aspirin 81 MG chewable tablet  Chew 1 tablet Daily. Resume after completion of Xarelto RX             hydrochlorothiazide (HYDRODIURIL) 25 MG tablet  Take 1 tablet by mouth Every Morning. Resume when BP improves and OK with PCP             insulin detemir (LEVEMIR) 100 UNIT/ML injection  Inject 40 Units under the skin Every Evening. PT TO CALL PCP REGARDING NPO AFTER MIDNIGHT AND SEE IF THEY WANT TO ADJUST INSULIN DOSE NIGHT BEFORE SURGERY             LEVEMIR 100 UNIT/ML injection  Inject  "30 Units under the skin Every Morning.             levocetirizine (XYZAL) 5 MG tablet  Take 5 mg by mouth Every Evening.             lisinopril (PRINIVIL,ZESTRIL) 40 MG tablet  Take 1 tablet by mouth Every Night. Resume when BP improves and OK with PCP             magnesium oxide (MAG-OX) 400 MG tablet  Take 400 mg by mouth Daily. HOLD PRIOR TO SURGERY             metFORMIN (GLUCOPHAGE) 500 MG tablet  Take 500 mg by mouth 2 (Two) Times a Day With Meals.             metoprolol succinate XL (TOPROL-XL) 50 MG 24 hr tablet  Take 50 mg by mouth Every Morning.             montelukast (SINGULAIR) 10 MG tablet  Take 10 mg by mouth Every Night.             oxyCODONE-acetaminophen (PERCOCET) 7.5-325 MG per tablet  Take 1-2 tabs po q 4 hours prn pain             potassium chloride (K-DUR) 10 MEQ CR tablet  Take 10 mEq by mouth Daily.             RELION INSULIN SYR 0.5CC/29G 29G X 1/2\" 0.5 ML misc               rivaroxaban (XARELTO) 10 MG tablet  Take 1 tablet by mouth Daily With Dinner.             sennosides-docusate sodium (SENOKOT-S) 8.6-50 MG tablet  Take 2 tablets by mouth 2 (Two) Times a Day.             sertraline (ZOLOFT) 100 MG tablet  Take 50 mg by mouth Daily.             simvastatin (ZOCOR) 40 MG tablet  Take 40 mg by mouth Every Night.                 Discharge Diet: regular    Activity at Discharge: as tolerated    Discharge Instructions:   1)  Patient is to continue with physical therapy exercises daily and continue working with the physical therapist as ordered.  2)  Continue to follow precautions as instructed.   3)  Patient may weight bear as tolerated.   4)  Continue CASSANDRA hose daily and ice regularly. Patient instructed on frequent calf pumping exercises.  Patient also instructed on incentive spirometer during hospitalization and encouraged to continue to use at home regularly.   5)  Patient is instructed to continue DVT prophylaxis.  6)  The dressing should be left in place. If waterproof dressing is intact " the patient may shower immediately following discharge. If the dressing becomes disloged or saturated it should be changed and patient must wait until POD #5 to shower. If dressing is changed, apply dry sterile dressing after showering.  7)  Follow up appointment in 2 weeks - patient to call the office at [phone] to schedule.   8)  Will have home health monitor BP and contact PCP about when to resume HCTZ and Lisinopril.     Follow-up Appointments  2 weeks with Dr Eyal Fernandez RN  03/23/17  10:29 AM    Lalit Birch MD

## 2017-03-23 NOTE — PLAN OF CARE
Problem: Inpatient Physical Therapy  Goal: Bed Mobility Goal LTG- PT    03/23/17 1124   Bed Mobility PT LTG   Bed Mobility PT LTG, Outcome goal met       Goal: Transfer Training Goal 1 LTG- PT    03/23/17 1124   Transfer Training PT LTG   Transfer Training PT LTG, Outcome goal met       Goal: Gait Training Goal LTG- PT    03/23/17 1124   Gait Training PT LTG   Gait Training Goal PT LTG, Outcome goal partially met   Gait Training Goal PT LTG, Reason Goal Not Met discharged from facility

## 2017-03-23 NOTE — PLAN OF CARE
Problem: Patient Care Overview (Adult)  Goal: Plan of Care Review  Outcome: Ongoing (interventions implemented as appropriate)    03/23/17 0256   Patient Care Overview   Progress progress toward functional goals as expected   Outcome Evaluation   Outcome Summary/Follow up Plan Pt is a post op day 1 of a rt total hip. Pt has much better pain control this shift. Pt has ambulated to bathroom with an assist of 1. Pt is resting at this time.       Goal: Adult Individualization and Mutuality  Outcome: Ongoing (interventions implemented as appropriate)  Goal: Discharge Needs Assessment  Outcome: Ongoing (interventions implemented as appropriate)    Problem: Perioperative Period (Adult)  Goal: Signs and Symptoms of Listed Potential Problems Will be Absent or Manageable (Perioperative Period)  Outcome: Ongoing (interventions implemented as appropriate)  Goal: Signs and Symptoms of Listed Potential Problems Will be Absent or Manageable (Perioperative Period)  Outcome: Ongoing (interventions implemented as appropriate)    Problem: Hip Replacement, Total (Adult)  Goal: Signs and Symptoms of Listed Potential Problems Will be Absent or Manageable (Hip Replacement, Total)  Outcome: Ongoing (interventions implemented as appropriate)    Problem: Pain, Acute (Adult)  Goal: Identify Related Risk Factors and Signs and Symptoms  Outcome: Ongoing (interventions implemented as appropriate)  Goal: Acceptable Pain Control/Comfort Level  Outcome: Ongoing (interventions implemented as appropriate)

## 2017-03-23 NOTE — THERAPY DISCHARGE NOTE
Acute Care - Physical Therapy Treatment Note/Discharge  [facility]     Patient Name: Monie Mccoy  : 1950  MRN: [id number]  Today's Date: 3/23/2017  Onset of Illness/Injury or Date of Surgery Date: 17          Admit Date: 3/21/2017    Visit Dx:    ICD-10-CM ICD-9-CM   1. Primary osteoarthritis of right hip M16.11 715.15   2. Osteoarthritis of one hip, right M16.11 715.95     Patient Active Problem List   Diagnosis   • History of total knee arthroplasty   • Right hip pain   • Primary osteoarthritis of right hip       Physical Therapy Education     Title: PT OT SLP Therapies (Resolved)     Topic: Physical Therapy (Resolved)     Point: Mobility training (Resolved)    Learning Progress Summary    Learner Readiness Method Response Comment Documented by Status   Patient Acceptance E,D VU,NR  MS 17 1124 Done    Acceptance E,D VU,NR  MS 17 1106 Done    Acceptance E VU  AR 17 1451 Done               Point: Home exercise program (Resolved)    Learning Progress Summary    Learner Readiness Method Response Comment Documented by Status   Patient Acceptance E,D VU,NR  MS 17 1124 Done    Acceptance E,D VU,NR  MS 17 1106 Done    Acceptance E VU  AR 17 1451 Done               Point: Body mechanics (Resolved)    Learning Progress Summary    Learner Readiness Method Response Comment Documented by Status   Patient Acceptance E,D VU,NR  MS 17 1124 Done    Acceptance E,D VU,NR  MS 17 1106 Done    Acceptance E VU  AR 17 1451 Done               Point: Precautions (Resolved)    Learning Progress Summary    Learner Readiness Method Response Comment Documented by Status   Patient Acceptance E,D VU,NR  MS 17 1124 Done    Acceptance E,D VU,NR  MS 17 1106 Done    Acceptance E VU  AR 17 1451 Done                      User Key     Initials Effective Dates Name Provider Type Discipline    MS 12/01/15 -  Victor Hugo Can, PT Physical Therapist PT    AR 16 -   Jeniffer Dorman, PT Physical Therapist PT                    IP PT Goals       03/23/17 1124 03/21/17 1450       Bed Mobility PT LTG    Bed Mobility PT LTG, Date Established  03/21/17  -AR     Bed Mobility PT LTG, Time to Achieve  1 wk  -AR     Bed Mobility PT LTG, Activity Type  supine to sit/sit to supine  -AR     Bed Mobility PT LTG, Woodruff Level  supervision required  -AR     Bed Mobility PT LTG, Outcome goal met  -MS      Transfer Training PT LTG    Transfer Training PT LTG, Date Established  03/21/17  -AR     Transfer Training PT LTG, Time to Achieve  1 wk  -AR     Transfer Training PT LTG, Activity Type  sit to stand/stand to sit;bed to chair /chair to bed  -AR     Transfer Training PT LTG, Woodruff Level  supervision required  -AR     Transfer Training PT LTG, Assist Device  walker, rolling  -AR     Transfer Training PT LTG, Outcome goal met  -MS      Gait Training PT LTG    Gait Training Goal PT LTG, Date Established  03/21/17  -AR     Gait Training Goal PT LTG, Time to Achieve  1 wk  -AR     Gait Training Goal PT LTG, Woodruff Level  supervision required  -AR     Gait Training Goal PT LTG, Assist Device  walker, rolling  -AR     Gait Training Goal PT LTG, Distance to Achieve  150  -AR     Gait Training Goal PT LTG, Outcome goal partially met  -MS      Gait Training Goal PT LTG, Reason Goal Not Met discharged from facility  -MS        User Key  (r) = Recorded By, (t) = Taken By, (c) = Cosigned By    Initials Name Provider Type    MS Victor Hugo Can, PT Physical Therapist    AR Jeniffer Dorman, PT Physical Therapist              Adult Rehabilitation Note       03/23/17 1122 03/22/17 1539 03/22/17 1104    Rehab Assessment/Intervention    Discipline physical therapist  -MS physical therapist  -MS physical therapist  -MS    Document Type therapy note (daily note);discharge summary  -MS therapy note (daily note)  -MS therapy note (daily note)  -MS    Subjective Information agree to  therapy;complains of;fatigue;pain  -MS agree to therapy;complains of;weakness;fatigue;pain  -MS agree to therapy;complains of;weakness;fatigue;pain  -MS    Patient Effort, Rehab Treatment good  -MS good  -MS good  -MS    Symptoms Noted During/After Treatment  fatigue;increased pain  -MS fatigue;increased pain  -MS    Symptoms Noted Comment  Pt. reports increased pain/soreness in her Right hip this PM.  -MS     Precautions/Limitations anterior hip precautions- right  -MS fall precautions;anterior hip precautions- right  -MS fall precautions;anterior hip precautions- right  -MS    Equipment Issued to Patient   --   Pt. already has RWX for home use.  -MS    Recorded by [MS] Victor Hugo Can PT [MS] Victor Hugo Can PT [MS] Victor Hugo Can PT    Pain Assessment    Pain Assessment 0-10  -MS 0-10  -MS 0-10  -MS    Pain Score 5  -MS 9  -MS 7  -MS    Post Pain Score 5  -MS 9  -MS 7  -MS    Pain Type Acute pain;Surgical pain  -MS Acute pain;Surgical pain  -MS Acute pain;Surgical pain  -MS    Pain Location Hip  -MS Hip  -MS Hip  -MS    Pain Orientation Right  -MS Right  -MS Right  -MS    Pain Intervention(s) Medication (See MAR);Cold applied;Repositioned;Elevated;Rest  -MS Medication (See MAR);Cold applied;Repositioned;Elevated;Rest  -MS Medication (See MAR);Cold applied;Repositioned;Elevated;Rest  -MS    Recorded by [MS] Victor Hugo Can PT [MS] Victor Hugo Can PT [MS] Victor Hugo Can PT    Cognitive Assessment/Intervention    Current Cognitive/Communication Assessment functional  -MS  functional  -MS    Orientation Status oriented x 4  -MS  oriented x 4  -MS    Follows Commands/Answers Questions 100% of the time  -MS  100% of the time  -MS    Personal Safety WNL/WFL  -MS  WNL/WFL  -MS    Personal Safety Interventions fall prevention program maintained;gait belt;nonskid shoes/slippers when out of bed;supervised activity  -MS  fall prevention program maintained;gait belt;nonskid shoes/slippers when out of  bed;supervised activity  -MS    Recorded by [MS] Victor Hugo Can PT  [MS] Victor Hugo Can PT    Bed Mobility, Assessment/Treatment    Bed Mob, Supine to Sit, Beaver supervision required  -MS      Bed Mob, Sit to Supine, Beaver supervision required  -MS      Bed Mobility, Comment  Up in chair this PM.  -MS Up in chair this AM.  -MS    Recorded by [MS] Victor Hugo Can PT [MS] Victor Hugo Can PT [MS] Victor Hugo Can PT    Transfer Assessment/Treatment    Transfers, Sit-Stand Beaver independent  -MS contact guard assist  -MS contact guard assist  -MS    Transfers, Stand-Sit Beaver independent  -MS contact guard assist  -MS contact guard assist  -MS    Transfers, Sit-Stand-Sit, Assist Device rolling walker  -MS rolling walker  -MS rolling walker  -MS    Recorded by [MS] Victor Hugo Can PT [MS] Victor Hugo Can PT [MS] Victor Hugo Can PT    Gait Assessment/Treatment    Gait, Beaver Level independent  -MS contact guard assist  -MS contact guard assist  -MS    Gait, Assistive Device rolling walker  -MS rolling walker  -MS rolling walker  -MS    Gait, Distance (Feet) 100  -MS 55  -MS 90  -MS    Gait, Gait Deviations right:;antalgic;hoa decreased  -MS right:;antalgic;hoa decreased;forward flexed posture  -MS right:;antalgic;hoa decreased;decreased heel strike;forward flexed posture  -MS    Gait, Comment  Pt. limited in gait distance this PM due to increase in pain/soreness.  Verbal/tactile cues required for posture correction.  -MS Verbal/tactile cues required for posture correction and to increase her Bilateral heel strike during gait cycle  -MS    Recorded by [MS] Victor Hugo Can PT [MS] Victor Hugo Can PT [MS] Victor Hugo Can PT    Stairs Assessment/Treatment    Number of Stairs --   Pt reports no stairs to enter home and no stairs once inside  -MS      Recorded by [MS] Victor Hugo Can PT      Therapy Exercises    Exercise Protocols total hip  -MS total hip   -MS total hip  -MS    Total Hip Exercises right:;25 reps;completed protocol  -MS right:;20 reps;completed protocol  -MS right:;15 reps;completed protocol  -MS    Recorded by [MS] Victor Hugo Can PT [MS] Victor Hugo Can PT [MS] Victor Hugo Can PT    Positioning and Restraints    Pre-Treatment Position sitting in chair/recliner  -MS sitting in chair/recliner  -MS sitting in chair/recliner  -MS    Post Treatment Position bed  -MS chair  -MS chair  -MS    In Bed notified nsg;supine;call light within reach;encouraged to call for assist   Ice pack to Right hip  -MS      In Chair  notified nsg;reclined;sitting;call light within reach;encouraged to call for assist   Ice pack to Right hip.  -MS notified nsg;reclined;sitting;call light within reach;encouraged to call for assist;with family/caregiver   Ice pack to Right hip  -MS    Recorded by [MS] Victor Hugo Can PT [MS] Victor Hugo Can PT [MS] Victor Hugo Can PT      User Key  (r) = Recorded By, (t) = Taken By, (c) = Cosigned By    Initials Name Effective Dates    MS Victor Hugo Can PT 12/01/15 -           PT Recommendation and Plan  Anticipated Discharge Disposition: home with assist, home with home health  Planned Therapy Interventions: balance training, bed mobility training, gait training, home exercise program, patient/family education, ROM (Range of Motion), stair training, strengthening  PT Frequency: 2 times/day  Plan of Care Review  Plan Of Care Reviewed With: patient  Progress: improving  Outcome Summary/Follow up Plan: Pt. limited in overall gait distance this PM due to an increase in Right hip pain/soreness.  Pt. was able to tolerate progression of ther. ex. protocol despite the pain/soreness however.          Outcome Measures       03/23/17 1100 03/22/17 1500 03/22/17 1100    How much help from another person do you currently need...    Turning from your back to your side while in flat bed without using bedrails? 4  -MS 4  -MS 4  -MS    Moving from  lying on back to sitting on the side of a flat bed without bedrails? 4  -MS 4  -MS 4  -MS    Moving to and from a bed to a chair (including a wheelchair)? 4  -MS 3  -MS 3  -MS    Standing up from a chair using your arms (e.g., wheelchair, bedside chair)? 4  -MS 3  -MS 3  -MS    Climbing 3-5 steps with a railing? 3  -MS 3  -MS 3  -MS    To walk in hospital room? 4  -MS 3  -MS 3  -MS    AM-PAC 6 Clicks Score 23  -MS 20  -MS 20  -MS    Functional Assessment    Outcome Measure Options AM-PAC 6 Clicks Basic Mobility (PT)  -MS AM-PAC 6 Clicks Basic Mobility (PT)  -MS AM-PAC 6 Clicks Basic Mobility (PT)  -MS      03/21/17 1400          How much help from another person do you currently need...    Turning from your back to your side while in flat bed without using bedrails? 4  -AR      Moving from lying on back to sitting on the side of a flat bed without bedrails? 4  -AR      Moving to and from a bed to a chair (including a wheelchair)? 3  -AR      Standing up from a chair using your arms (e.g., wheelchair, bedside chair)? 3  -AR      Climbing 3-5 steps with a railing? 3  -AR      To walk in hospital room? 3  -AR      AM-PAC 6 Clicks Score 20  -AR      Functional Assessment    Outcome Measure Options AM-PAC 6 Clicks Basic Mobility (PT)  -AR        User Key  (r) = Recorded By, (t) = Taken By, (c) = Cosigned By    Initials Name Provider Type    MS Victor Hugo PAZ Pamella, PT Physical Therapist    AR Jeniffer Dorman PT Physical Therapist           Time Calculation:         PT Charges       03/23/17 1125          Time Calculation    Start Time 0930  -MS      Stop Time 0958  -MS      Time Calculation (min) 28 min  -MS      PT Received On 03/23/17  -MS        User Key  (r) = Recorded By, (t) = Taken By, (c) = Cosigned By    Initials Name Provider Type    MS Victor Hugo PAZ Pamella, PT Physical Therapist          Therapy Charges for Today     Code Description Service Date Service Provider Modifiers Qty    63327302382  PT THER PROC EA 15 MIN  3/22/2017 Victor Hugo Can, PT GP 1    30177971398 HC PT THER PROC GROUP 3/22/2017 Victor Hugo Can, PT GP 1    01338316778 HC PT THER PROC EA 15 MIN 3/22/2017 Victor Hugo Can, PT GP 1    98344958707 HC PT THER PROC GROUP 3/22/2017 Victor Hugo Can, PT GP 1    48083345733 HC PT THER PROC EA 15 MIN 3/23/2017 Victor Hugo Can, PT GP 1    73469859868 HC PT THER PROC GROUP 3/23/2017 Victor Hugo Can, PT GP 1          PT G-Codes  Outcome Measure Options: AM-PAC 6 Clicks Basic Mobility (PT)    PT Discharge Summary  Reason for Discharge: Discharge from facility  Outcomes Achieved: Refer to plan of care for updates on goals achieved  Discharge Destination: Home with assist, Home with home health    Victor Hugo Can, PT  3/23/2017

## 2017-03-27 ENCOUNTER — TELEPHONE (OUTPATIENT)
Dept: ORTHOPEDIC SURGERY | Facility: CLINIC | Age: 67
End: 2017-03-27

## 2017-03-27 NOTE — TELEPHONE ENCOUNTER
Patient's daughter called stating wanting to know if the Percocet could cause her mother confusion following her surgery and also she has some itching, could that also be related to the Percocet.  She was told that it most definitely could be the reason for both the confusion and itching.  Allergic reaction precautions have been instructed.  She is going to half the Percocet and only use it as needed.  Right now she is not in a lot of pain, so she will use Tylenol.  She has a follow up this Friday and also advised if she needs a different medication, such as Hydrocodone, we can offer that for her.  She will keep us posted.

## 2017-03-27 NOTE — TELEPHONE ENCOUNTER
Please call patient and let her know that she needs to cut back on the use of the Percocet and use either ibuprofen or Tylenol for pain control.  I agree with what she told her on the telephone thank you

## 2017-03-31 ENCOUNTER — OFFICE VISIT (OUTPATIENT)
Dept: ORTHOPEDIC SURGERY | Facility: CLINIC | Age: 67
End: 2017-03-31

## 2017-03-31 DIAGNOSIS — M16.11 PRIMARY OSTEOARTHRITIS OF RIGHT HIP: Primary | ICD-10-CM

## 2017-03-31 DIAGNOSIS — Z96.641 STATUS POST TOTAL HIP REPLACEMENT, RIGHT: ICD-10-CM

## 2017-03-31 PROCEDURE — 99024 POSTOP FOLLOW-UP VISIT: CPT | Performed by: ORTHOPAEDIC SURGERY

## 2017-03-31 RX ORDER — HYDROCODONE BITARTRATE AND ACETAMINOPHEN 5; 325 MG/1; MG/1
1 TABLET ORAL EVERY 6 HOURS PRN
Qty: 45 TABLET | Refills: 0 | Status: SHIPPED | OUTPATIENT
Start: 2017-03-31

## 2017-03-31 RX ORDER — ONDANSETRON 4 MG/1
4 TABLET, FILM COATED ORAL EVERY 6 HOURS PRN
Qty: 40 TABLET | Refills: 0 | Status: SHIPPED | OUTPATIENT
Start: 2017-03-31

## 2017-04-05 PROBLEM — Z96.641 STATUS POST TOTAL HIP REPLACEMENT, RIGHT: Status: ACTIVE | Noted: 2017-04-05

## 2017-04-13 ENCOUNTER — TELEPHONE (OUTPATIENT)
Dept: ORTHOPEDIC SURGERY | Facility: CLINIC | Age: 67
End: 2017-04-13

## 2017-04-14 RX ORDER — HYDROCODONE BITARTRATE AND ACETAMINOPHEN 7.5; 325 MG/1; MG/1
TABLET ORAL
Qty: 40 TABLET | Refills: 0 | Status: SHIPPED | OUTPATIENT
Start: 2017-04-14

## 2017-04-14 NOTE — TELEPHONE ENCOUNTER
Script written and printed. Please have patient come in and pick it up  Tab Norco 7.5 /325 mg PO Q 6 hours prn pain # 40  No refills  SM

## 2017-04-17 ENCOUNTER — TELEPHONE (OUTPATIENT)
Dept: ORTHOPEDIC SURGERY | Facility: CLINIC | Age: 67
End: 2017-04-17

## 2017-04-26 ENCOUNTER — TELEPHONE (OUTPATIENT)
Dept: ORTHOPEDIC SURGERY | Facility: CLINIC | Age: 67
End: 2017-04-26

## 2017-04-26 NOTE — TELEPHONE ENCOUNTER
It is okay for the patient to drive.  Just remind her to take her cane with her so that she does not fall while getting out of the vehicle and sustained an injury or a dislocation

## 2017-05-16 ENCOUNTER — OFFICE VISIT (OUTPATIENT)
Dept: ORTHOPEDIC SURGERY | Facility: CLINIC | Age: 67
End: 2017-05-16

## 2017-05-16 DIAGNOSIS — Z96.641 HISTORY OF TOTAL RIGHT HIP ARTHROPLASTY: Primary | ICD-10-CM

## 2017-05-16 DIAGNOSIS — Z96.641 STATUS POST TOTAL HIP REPLACEMENT, RIGHT: ICD-10-CM

## 2017-05-16 PROCEDURE — 73501 X-RAY EXAM HIP UNI 1 VIEW: CPT | Performed by: ORTHOPAEDIC SURGERY

## 2017-05-16 PROCEDURE — 99024 POSTOP FOLLOW-UP VISIT: CPT | Performed by: ORTHOPAEDIC SURGERY

## 2017-05-16 RX ORDER — POTASSIUM CHLORIDE 750 MG/1
TABLET, EXTENDED RELEASE ORAL
COMMUNITY
Start: 2017-05-11

## 2017-05-16 RX ORDER — HYDROCODONE BITARTRATE AND ACETAMINOPHEN 5; 325 MG/1; MG/1
1 TABLET ORAL NIGHTLY PRN
Qty: 25 TABLET | Refills: 0 | Status: SHIPPED | OUTPATIENT
Start: 2017-05-16

## 2017-05-16 RX ORDER — DICLOFENAC SODIUM 75 MG/1
TABLET, DELAYED RELEASE ORAL
COMMUNITY
Start: 2017-05-11

## 2017-05-16 RX ORDER — FLUOXETINE HYDROCHLORIDE 20 MG/1
CAPSULE ORAL
COMMUNITY
Start: 2017-05-13

## 2017-11-14 ENCOUNTER — OFFICE VISIT (OUTPATIENT)
Dept: ORTHOPEDIC SURGERY | Facility: CLINIC | Age: 67
End: 2017-11-14

## 2017-11-14 DIAGNOSIS — Z96.651 HISTORY OF TOTAL RIGHT KNEE REPLACEMENT: ICD-10-CM

## 2017-11-14 DIAGNOSIS — Z96.641 HISTORY OF TOTAL RIGHT HIP ARTHROPLASTY: Primary | ICD-10-CM

## 2017-11-14 PROCEDURE — 99213 OFFICE O/P EST LOW 20 MIN: CPT | Performed by: ORTHOPAEDIC SURGERY

## 2017-11-14 PROCEDURE — 73502 X-RAY EXAM HIP UNI 2-3 VIEWS: CPT | Performed by: ORTHOPAEDIC SURGERY

## 2017-11-14 NOTE — PROGRESS NOTES
Chief Complaint   Patient presents with   • Right Hip - Follow-up   Patient is here today following up on a right hip arthroplasty.  Patient is also following up on a right knee replacement performed by me in 2012.      HPI she is doing extremely well at this point.  Her right hip replacement was performed by me from an anterior approach in March 2017.  She states that it has improved her quality of life very significantly.  She does not have a limb length discrepancy.  She does not have a neurovascular deficit.  Her ambulation is much improved and she does not have any pain that requires pain medication at this point.  She does not use a cane or a walker at this point.  The right total knee replacement also helped her quality of life tremendously for the better.  She can go up and down the steps without too much difficulty.  She does have left lower extremity symptoms which appear to be radicular in nature.  The pain starts in the base of the lumbar spine and radiates to the left SI joint of the hip.  There is some numbness and tingling with this pain as would be expected with symptoms coming from compression of the nerves in the lower lumbar spine.  We have discussed about getting an appointment with a pain clinic and getting an MRI of the lumbar spine as well.        There were no vitals filed for this visit.        Review of Systems   Constitutional: Negative.    HENT: Negative.    Eyes: Negative.    Respiratory: Negative.    Cardiovascular: Negative.    Gastrointestinal: Negative.    Endocrine: Negative.    Genitourinary: Negative.    Musculoskeletal: Negative.    Skin: Negative.    Allergic/Immunologic: Negative.    Neurological: Negative.    Hematological: Negative.    Psychiatric/Behavioral: Negative.            Physical Exam   Constitutional: She is oriented to person, place, and time. She appears well-nourished.   HENT:   Head: Atraumatic.   Eyes: EOM are normal.   Neck: Neck supple.   Cardiovascular:  Normal rate, regular rhythm, normal heart sounds and intact distal pulses.    Pulmonary/Chest: Breath sounds normal.   Abdominal: Bowel sounds are normal.   Musculoskeletal: She exhibits edema and tenderness. She exhibits no deformity.   Neurological: She is alert and oriented to person, place, and time. She has normal reflexes.   Skin: Skin is dry.   Psychiatric: She has a normal mood and affect. Her behavior is normal. Judgment and thought content normal.   Nursing note and vitals reviewed.          Joint/Body Part Specific Exam:  right hip. Patient is post op 8 month(s) from total hip arthoplasty, direct anterior. Incision is clean and healing well. Calf is soft and nontender. Homans sign is negative. Skin and soft tissues are appropriate for postoperative status of the patient. Hip flexion is 0-0-60 degrees, hip abduction is 0-0-30 degrees. No limb lenth discrepancy. Neurovascular status is intact. Patients gait is significantly improved. The pain relief is extremely dramatic for the patient. Dorsalis pedis and posterior tibial artery pulses palpable. Common peroneal function is well preserved. There is no evidence of cellulitis or erythema or deep seated joint infection.    right knee.The patient is status post total knee arthroplasty postoperative 2.5 year(s). Incision is clean. Calf is soft and nontender. Homans sign is negative. There is no clicking, popping or catching. Anterior and posterior drawer signs are negative.  There is no instability of the components. Appropriate amounts of swelling and bruising are noted. Dorsalis pedis and posterior tibial artery pulses are palpable. Common peroneal nerve function is well preserved. Range of motion is from 0- 130° degrees of flexion. Gait is cautious but otherwise fairly normal. There is no evidence of a deep seated joint infection.  X-RAY Report:  right Hip X-Ray  Indication: Evaluation of implant position after total hip arthroplasty  AP and  lateral  Findings: Well placed implants with a good press fit without any subsidence  no bony lesion  Soft tissues within normal limits  decreased joint spaces  Hardware appropriately positioned yes      yes prior studies available for comparison.    X-RAY was ordered and reviewed by Lalit Birch MD          Diagnostics:        Monie was seen today for follow-up.    Diagnoses and all orders for this visit:    History of total right hip arthroplasty  -     XR Hip With or Without Pelvis 2 - 3 View Right            Procedures        Plan:  Weightbearing as tolerated with stretching and strengthening exercises of the hip and the knee muscles.    Tablet Mobic 7.5 mg tab 1 by mouth daily at bedtime for pain swelling and the sciatic symptoms.    , GI and dental procedure prophylaxis with antibiotics to prevent a metastatic infection of the hip and knee arthroplasty implants.    Tablet ibuprofen 600 mg orally daily at bedtime for pain and discomfort.    Dislocation precautions.    Follow-up in 1 year for yearly joint surveillance.

## 2017-11-18 PROBLEM — Z96.641 HISTORY OF TOTAL RIGHT HIP ARTHROPLASTY: Status: ACTIVE | Noted: 2017-11-18

## 2018-10-29 ENCOUNTER — TELEPHONE (OUTPATIENT)
Dept: ORTHOPEDIC SURGERY | Facility: CLINIC | Age: 68
End: 2018-10-29

## 2018-10-29 NOTE — TELEPHONE ENCOUNTER
Patient hasn't been seen since 11/14/2017.  She states she is having pain in her opposite hip.  She has not had any work up done and has not been x-rayed.  She is in much pain states she is walking on a walker.  I would like to work her in on November 6th.  Have you checked your schedule at [facility] to see if we can see patient's on that Tuesday.

## 2018-10-29 NOTE — TELEPHONE ENCOUNTER
Can you please check with Ceci in the [place] office to see if I can continue to see half day patient's in [place] on Tuesdays through the end of the year?  Please help me coordinate this so that we can take care of patient's in both locations thank you

## 2018-11-06 ENCOUNTER — OFFICE VISIT (OUTPATIENT)
Dept: ORTHOPEDIC SURGERY | Facility: CLINIC | Age: 68
End: 2018-11-06

## 2018-11-06 DIAGNOSIS — Z96.641 STATUS POST TOTAL HIP REPLACEMENT, RIGHT: ICD-10-CM

## 2018-11-06 DIAGNOSIS — M25.552 LEFT HIP PAIN: Primary | ICD-10-CM

## 2018-11-06 DIAGNOSIS — M16.12 PRIMARY OSTEOARTHRITIS OF LEFT HIP: ICD-10-CM

## 2018-11-06 PROCEDURE — 73502 X-RAY EXAM HIP UNI 2-3 VIEWS: CPT | Performed by: ORTHOPAEDIC SURGERY

## 2018-11-06 PROCEDURE — 99213 OFFICE O/P EST LOW 20 MIN: CPT | Performed by: ORTHOPAEDIC SURGERY

## 2018-11-06 NOTE — PROGRESS NOTES
Chief Complaint   Patient presents with   • Left Hip - Establish Care   This is an established patient here today with left hip pain. She has had this issue for about 2 weeks. She denies any type of trauma.           HPI patient had a hip replacement on the right side performed by me through an anterior approach 2 years ago.  That hip replacement has done extremely well for the patient.  She has no limb length discrepancy.  She does not have any significant pain to speak off on that side.  Her walking distance has improved significantly.  She is very pleased with her outcome.  Now her opposite hip is starting to bother her on the left side.  This is a part of ongoing pain and discomfort from the lumbar spine which radiates into the hip the buttock and thigh.  She has difficulty with turning over in bed at night.  She cannot sleep comfortably without the pain and discomfort.  She states that the pain from the arthritis of this hip is very similar to the pain from the opposite side before she had surgical intervention.  She also had complaints of chronic low back pain at this visit.  This is a dull aching sensation that radiates into the buttock and the groin as well.  There is some numbness and tingling associated with this pain and discomfort.          Allergies   Allergen Reactions   • Percocet [Oxycodone-Acetaminophen] Mental Status Change and Hallucinations   • Amoxicillin Itching and Rash   • Furadantin [Nitrofurantoin] Hives, Itching and Rash         Social History     Social History   • Marital status:      Spouse name: N/A   • Number of children: N/A   • Years of education: N/A     Occupational History   • Not on file.     Social History Main Topics   • Smoking status: Former Smoker     Packs/day: 2.00     Years: 30.00     Types: Cigarettes     Quit date: 1999   • Smokeless tobacco: Not on file   • Alcohol use No   • Drug use: No   • Sexual activity: Defer     Other Topics Concern   • Not on file      Social History Narrative   • No narrative on file       No family history on file.    Past Surgical History:   Procedure Laterality Date   • CARDIAC CATHETERIZATION     • CHOLECYSTECTOMY     • COLONOSCOPY     • CORONARY ANGIOPLASTY WITH STENT PLACEMENT     • HYSTERECTOMY     • KNEE ARTHROPLASTY Right    • SHOULDER ARTHROSCOPY Left    • TOTAL HIP ARTHROPLASTY Right 3/21/2017    Procedure: TOTAL HIP ARTHROPLASTY ANTERIOR WITH HANA TABLE;  Surgeon: Lalit Birch MD;  Location: [facility];  Service:    • TUBAL ABDOMINAL LIGATION         Past Medical History:   Diagnosis Date   • Anxiety and depression    • Arthritis     OSTEO   • Cataract     BEGINNING   • Diabetes mellitus (CMS/HCC)     TYPE 2   • High cholesterol    • History of colitis    • History of coronary artery disease    • History of diverticulitis    • History of [place] spotted fever     2 YR AGO   • History of transfusion 1978    S/P DELIVERY OF SON   • Hypertension    • Old MI (myocardial infarction) 1999   • Right hip pain    • Seasonal allergies            There were no vitals filed for this visit.          Review of Systems   Constitutional: Negative.    HENT: Negative.    Eyes: Negative.    Respiratory: Negative.    Cardiovascular: Negative.    Gastrointestinal: Negative.    Endocrine: Negative.    Genitourinary: Negative.    Musculoskeletal: Positive for back pain, gait problem and joint swelling.   Skin: Negative.    Allergic/Immunologic: Negative.    Hematological: Negative.    Psychiatric/Behavioral: Negative.            Physical Exam   Constitutional: She is oriented to person, place, and time. She appears well-nourished.   HENT:   Head: Atraumatic.   Eyes: EOM are normal.   Neck: Neck supple.   Cardiovascular: Regular rhythm, normal heart sounds and intact distal pulses.   Pulmonary/Chest: Breath sounds normal.   Abdominal: Bowel sounds are normal.   Musculoskeletal: She exhibits edema and tenderness.   Neurological: She is alert  and oriented to person, place, and time.   Skin: Skin is dry. Capillary refill takes 2 to 3 seconds.   Psychiatric: She has a normal mood and affect. Her behavior is normal. Judgment and thought content normal.   Nursing note and vitals reviewed.              Joint/Body Part Specific Exam:  right hip. Patient is post op 2 year(s) from total hip arthoplasty, direct anterior. Incision is clean and healing well. Calf is soft and nontender. Homans sign is negative. Skin and soft tissues are appropriate for postoperative status of the patient. Hip flexion is 0-90 degrees, hip abduction is 0-30 degrees. No limb lenth discrepancy. Neurovascular status is intact. Patients gait is significantly improved. The pain relief is extremely dramatic for the patient. Dorsalis pedis and posterior tibial artery pulses palpable. Common peroneal function is well preserved. There is no evidence of cellulitis or erythema or deep seated joint infection.    left hip. Neurovascular status is intact. Patient has a distant limp on the affected side. Internal and external rotations are associated with pain and discomfort. Anterior joint line pain and tenderness is significant. Stinchfield sign is positive. Figure of 4 sign is positive. Patient is unable to perform an active straight leg raise exam. Greater trochanter is tender. Crossover adduction test is positive. Cross body adduction is limited and painful for the patient. Patient has very significant limp and joint line tenderness anteriorly, posteriorly and medially. Dorsalis pedis and posterior tibial artery pulses are palpable. Common peroneal nerve function is well preserved.    Lumbar Spine: The overlying skin and soft tissues are mildly swollen. Deep tendon reflexes are bilaterally, symmetric and equal.  No long tract signs are noted. There is No evidence of myelopathy. No bowel or bladder deficit noted. Mild spasm of erector spinae muscle is noted. left sided rotation is associated  with pain and discomfort. Straight leg raise test is positive to 60 degrees. Contralateral straight leg raise is negative. Pain radiates to buttock and thigh. Get up and go is slow due to the lumbar pain.No objective motor or sensory function loss is noted.       X-RAY Report:  Pelvis X-Ray  Indication: Evaluation of pain and discomfort in the left hip and position of the right hip arthroplasty implants  AP and Frogleg views  Findings: Moderately advanced degenerative osteoarthritis of the left hip.  Well-placed hip arthroplasty implants on the right side is noted.  no bony lesion  Soft tissues within normal limits  decreased joint spaces  Hardware appropriately positioned yes      yes prior studies available for comparison.    X-RAY was ordered and reviewed by Lalit Birch MD              Diagnostics:            Monie was seen today for establish care.    Diagnoses and all orders for this visit:    Left hip pain  -     XR Hip With or Without Pelvis 2 - 3 View Left            Procedures          I provided this patient with educational materials regarding exercise and bone health.        Plan: Discussed with the patient that she has early osteoarthritis of the left hip and at this point nonoperative management is the best option because the arthritis is not that advanced.    Use a cane in the opposite hand for assistance with ambulation.    Weightbearing as tolerated.    Stretching strengthening exercises of the hip flexors and abductors.    Dislocation precautions of the right hip arthroplasty which was performed 2 years ago.    Schedule an LESI at the pain clinic to help manage the symptoms of low back pain and radiculopathy.    Tablet ibuprofen 600 mg orally twice a day for pain swelling and discomfort.    Tablet Ultram 50 mg tab 1 by mouth daily at bedtime for pain swelling and discomfort and breakthrough pain as well.    Follow-up in my office in 1 year for reevaluation.  If her symptoms become worse before  that then she will call me and we can talk about scheduling hip replacement surgery based on the progression of her symptoms.    Controlled substance treatment options discussed in detail. Patient's signed consent to medical options on file. RADHA form in chart.  The patient is being provided this narcotic prescription to address the acute medical condition that they are undergoing/experiencing at this time.  It is my medical and surgical assessment that more than 3 days of narcotic medication is necessary to help control the pain and discomfort that this patient is experiencing at this point.  Risks of narcotic medication usage outlined.  Possibility of physical and psychological dependence and abuse, especially long term, emphasized to the patient.  I have explained to the patient that we will try to wean them off the narcotic medication as soon as possible and introduce non-narcotic modalities of pain control.  At this point in the patient's clinical spectrum, however, alternative available treatments are inadequate to control their pain and symptoms.  I have also discussed the possibility of random drug testing as well as pill counts to prevent misuse and misappropriation of the narcotic medications prescribed to the patient.      CC To Edu Jeff, PA

## 2018-11-14 DIAGNOSIS — M54.41 LOW BACK PAIN WITH BILATERAL SCIATICA, UNSPECIFIED BACK PAIN LATERALITY, UNSPECIFIED CHRONICITY: Primary | ICD-10-CM

## 2018-11-14 DIAGNOSIS — M54.42 LOW BACK PAIN WITH BILATERAL SCIATICA, UNSPECIFIED BACK PAIN LATERALITY, UNSPECIFIED CHRONICITY: Primary | ICD-10-CM

## 2018-12-11 ENCOUNTER — CONSULT (OUTPATIENT)
Dept: ORTHOPEDIC SURGERY | Facility: CLINIC | Age: 68
End: 2018-12-11

## 2018-12-11 VITALS — TEMPERATURE: 97.6 F | WEIGHT: 178 LBS | BODY MASS INDEX: 29.66 KG/M2 | HEIGHT: 65 IN

## 2018-12-11 DIAGNOSIS — M48.062 SPINAL STENOSIS OF LUMBAR REGION WITH NEUROGENIC CLAUDICATION: ICD-10-CM

## 2018-12-11 DIAGNOSIS — M43.16 SPONDYLOLISTHESIS OF LUMBAR REGION: ICD-10-CM

## 2018-12-11 DIAGNOSIS — M54.50 LUMBAR SPINE PAIN: Primary | ICD-10-CM

## 2018-12-11 PROCEDURE — 72100 X-RAY EXAM L-S SPINE 2/3 VWS: CPT | Performed by: ORTHOPAEDIC SURGERY

## 2018-12-11 PROCEDURE — 99214 OFFICE O/P EST MOD 30 MIN: CPT | Performed by: ORTHOPAEDIC SURGERY

## 2018-12-11 RX ORDER — TRAMADOL HYDROCHLORIDE 50 MG/1
TABLET ORAL
COMMUNITY
Start: 2018-11-06

## 2018-12-11 NOTE — PROGRESS NOTES
New patient or new problem visit    Chief Complaint   Patient presents with   • Lumbar Spine - Pain, Establish Care       HPI: She complains of low back pain radiating to the left lower extremity which is moderate constant grinding worse with activity.  Epidural injections are ordered that has not yet been performed.  Physical therapy did not work.  She is seen today at request of Dr. Mccarthy.    PFSH: See chart- reviewed    Review of Systems   Constitutional: Negative for chills, fever and unexpected weight change.   HENT: Negative for trouble swallowing and voice change.    Eyes: Negative for visual disturbance.   Respiratory: Negative for cough and shortness of breath.    Cardiovascular: Negative for chest pain and leg swelling.   Gastrointestinal: Negative for abdominal pain, nausea and vomiting.   Endocrine: Negative for cold intolerance and heat intolerance.   Genitourinary: Negative for difficulty urinating, frequency and urgency.   Musculoskeletal: Positive for back pain.   Skin: Negative for rash and wound.   Allergic/Immunologic: Negative for immunocompromised state.   Neurological: Positive for numbness. Negative for weakness.   Hematological: Does not bruise/bleed easily.   Psychiatric/Behavioral: Negative for dysphoric mood. The patient is not nervous/anxious.        PE: Constitutional: Vital signs above-noted.  Awake, alert and oriented    Psychiatric: Affect and insight do not appear grossly disturbed.    Pulmonary: Breathing is unlabored, color is good.    Skin: Warm, dry and normal turgor    Cardiac:  pedal pulses intact.  No edema.    Eyesight and hearing appear adequate for examination purposes      Musculoskeletal:  There is minimal tenderness to percussion and palpation of the spine. Motion appears undisturbed.  Posture is unremarkable to coronal and sagittal inspection.    The skin about the area is intact.  There is no palpable or visible deformity.  There is no local spasm.       Neurologic:    Reflexes are 2+ and symmetrical in the patellae untested in the Achilles.   Motor function is undisturbed in quadriceps, EHL, and gastrocnemius      Sensation appears symmetrically intact to light touch   .  In the bilateral lower extremities there is no evidence of atrophy.   Clonus is absent..  Gait appears undisturbed.  SLR test negative      MEDICAL DECISION MAKING    XRAY: Plain film x-rays of the lumbar spine demonstrate scoliosis and the excellent lumbar lordosis multilevel spondylolisthesis [id number] and multilevel disc degeneration.  MRI scan demonstrates various of stenosis moderate to severe central stenosis at L4 5 and mostly foraminal changes by report I don't have the MRI.    Other: n/a    Impression: Lumbar spondylolisthesis lumbar spinal stenosis lumbar scoliosis    Plan: For now lumbar epidural steroid injections.  I will see her back as needed.

## 2023-09-26 ENCOUNTER — OFFICE VISIT (OUTPATIENT)
Dept: NEUROSURGERY | Facility: CLINIC | Age: 73
End: 2023-09-26
Payer: MEDICARE

## 2023-09-26 VITALS
SYSTOLIC BLOOD PRESSURE: 137 MMHG | WEIGHT: 172 LBS | BODY MASS INDEX: 28.66 KG/M2 | HEIGHT: 65 IN | DIASTOLIC BLOOD PRESSURE: 50 MMHG | HEART RATE: 55 BPM

## 2023-09-26 DIAGNOSIS — M47.27 OSTEOARTHRITIS OF SPINE WITH RADICULOPATHY, LUMBOSACRAL REGION: Primary | ICD-10-CM

## 2023-09-26 DIAGNOSIS — M46.1 INFLAMMATION OF BOTH SACROILIAC JOINTS: ICD-10-CM

## 2023-09-26 RX ORDER — PIOGLITAZONEHYDROCHLORIDE 15 MG/1
1 TABLET ORAL DAILY
COMMUNITY
Start: 2023-08-17

## 2023-09-26 RX ORDER — FERROUS SULFATE 325(65) MG
1 TABLET ORAL DAILY
COMMUNITY
Start: 2023-08-05

## 2023-09-26 NOTE — PROGRESS NOTES
"Chief Complaint  Back Pain (Low back pain that radiates bilaterally hip to knee)    Subjective          Monie Mccoy who is a 73 y.o. year old female who presents to [facility] for evaluation of lumbar spine.      The patient complains of pain located in the lumbar spine.  Patients states the pain has been present for several years.  The pain came on gradually.  Pain is primarily in the middle of the back. The pain scale level is 7-10/10.  The pain does radiate. Dermatomes are located bilaterally Lumbar at: not below the knee..  Pain is worse on the right. The pain is constant and described as aching and burning.  The pain is worse at no particular time of day. Patient states prolonged standing, prolonged walking, bending, and twisting makes the pain worse.  Patient states rest makes the pain better.    Associated Symptoms Include: Numbness and Tingling in the feet. She is diabetic.     Conservative Interventions Include: Epidural Steroids that were somewhat effective. This was several years ago with [facility]. She also had injections in her hips. She has been prescribed NSAIDs, diclofenac, which did not help. She takes Tylenol currently for her pain. She was prescribed Tramadol and Norco in the past.     Was this the result of an injury or accident? : No    History of Previous Spinal Surgery?: No    Nicotine use: former smoker    BMI: Body mass index is 28.62 kg/m².      Review of Systems   Musculoskeletal:  Positive for arthralgias, back pain and myalgias.   All other systems reviewed and are negative.     Objective   Vital Signs:   /50 (BP Location: Left arm, Patient Position: Sitting, Cuff Size: Adult)   Pulse 55   Ht 165.1 cm (65\")   Wt 78 kg (172 lb)   BMI 28.62 kg/m²       Physical Exam  Vitals reviewed.   Constitutional:       Appearance: Normal appearance.   Musculoskeletal:      Lumbar back: No tenderness. Negative right straight leg raise " test and negative left straight leg raise test.      Right hip: Tenderness (SI joint) present. Normal range of motion.      Left hip: Tenderness (SI joint) present. Normal range of motion.   Neurological:      Mental Status: She is alert and oriented to person, place, and time.      Motor: Motor strength is normal.      Gait: Gait is intact.      Deep Tendon Reflexes:      Reflex Scores:       Patellar reflexes are 0 on the right side and 0 on the left side.       Achilles reflexes are 0 on the right side and 0 on the left side.     Neurologic Exam     Mental Status   Oriented to person, place, and time.   Level of consciousness: alert    Motor Exam   Muscle bulk: normal  Overall muscle tone: normal    Strength   Strength 5/5 throughout.     Sensory Exam   Light touch normal.     Gait, Coordination, and Reflexes     Gait  Gait: normal    Reflexes   Right patellar: 0  Left patellar: 0  Right achilles: 0  Left achilles: 0  Right ankle clonus: absent  Left ankle clonus: absentAntalgic      Result Review :       Data reviewed : Radiologic studies MRI Lumbar Spine at [facility] on 7/26/23 at [facility] personally reviewed. Multilevel spondylosis, resulting in multilevel foraminal stenosis without significant spinal stenosis. Severe bilateral foraminal stenosis at L5/S1. Severe left foraminal stenosis at L3/4. These are the most notable findings.           Assessment and Plan    Diagnoses and all orders for this visit:    1. Osteoarthritis of spine with radiculopathy, lumbosacral region (Primary)  -     Ambulatory Referral to Pain Management    2. Inflammation of both sacroiliac joints    Pt presenting for evaluation of chronic low back pain. We reviewed her MRI Lumbar Spine, demonstrating multilevel spondylosis most significant at L5/S1. There is foraminal stenosis however no significant spinal stenosis. Will refer to pain management for lumbar epidural steroid injections. She has some SI joint tenderness,  could consider SI joint injections as well.     Follow up as needed.         Follow Up   Return if symptoms worsen or fail to improve.  Patient was given instructions and counseling regarding her condition or for health maintenance advice.

## 2025-05-05 NOTE — CONSULTS
Name: Monie Mccoy ADMIT: 3/21/2017   : 1950  PCP: ALEX Cullen    MRN: [id number] LOS: 0 days   AGE/SEX: 67 y.o. female  ROOM: [facility]           Inpatient Consult to Hospitalist  Consult performed by: LINN ARELLANO  Consult ordered by: GRIFFIN CHOWDARY      Date of Admit: 3/21/2017  Date of Consult: 17    Subjective   History of Present Illness  66 yo WF seen postoperatively after undergoing right LI. Denies any nausea presently & pain was controlled. No CP or SOA        Past Medical History   Diagnosis Date   • Anxiety and depression    • Arthritis      OSTEO   • Cataract      BEGINNING   • Diabetes mellitus      TYPE 2   • High cholesterol    • History of colitis    • History of coronary artery disease    • History of diverticulitis    • History of [place] spotted fever      2 YR AGO   • History of transfusion 1978     S/P DELIVERY OF SON   • Hypertension    • Old MI (myocardial infarction)    • Right hip pain    • Seasonal allergies      Past Surgical History   Procedure Laterality Date   • Knee arthroplasty Right    • Cholecystectomy     • Tubal abdominal ligation     • Colonoscopy     • Cardiac catheterization     • Coronary angioplasty with stent placement     • Shoulder arthroscopy Left    • Hysterectomy       Family History  Father  of lung cancer, mother  of complications of diabetes.  She has 2 daughter s with lung cancer diagnosed in their early to mid 50's, one with metastatic disease but the other had successful resection. She has another daughter who has emphysema        Social History   Substance Use Topics   • Smoking status: Former Smoker     Packs/day: 2.00     Years: 30.00     Types: Cigarettes     Quit date:    • Smokeless tobacco: None   • Alcohol use No     Prescriptions Prior to Admission   Medication Sig Dispense Refill Last Dose   • acetaminophen (TYLENOL) 500 MG tablet Take 1,000 mg by mouth Every 6 (Six) Hours As Needed for mild pain  (1-3) or moderate pain (4-6).   3/20/2017 at 0800   • hydrochlorothiazide (HYDRODIURIL) 25 MG tablet Take 25 mg by mouth Every Morning.   3/20/2017 at 0900   • insulin detemir (LEVEMIR) 100 UNIT/ML injection Inject 40 Units under the skin Every Evening. PT TO CALL PCP REGARDING NPO AFTER MIDNIGHT AND SEE IF THEY WANT TO ADJUST INSULIN DOSE NIGHT BEFORE SURGERY   3/20/2017 at 2000   • LEVEMIR 100 UNIT/ML injection Inject 30 Units under the skin Every Morning.   3/20/2017 at 0900   • metFORMIN (GLUCOPHAGE) 500 MG tablet Take 500 mg by mouth 2 (Two) Times a Day With Meals.   3/20/2017 at 2000   • sertraline (ZOLOFT) 100 MG tablet Take 50 mg by mouth Daily.   3/20/2017 at 0800   • simvastatin (ZOCOR) 40 MG tablet Take 40 mg by mouth Every Night.   3/20/2017 at 2000   • TRAMADOL HCL PO Take 1 tablet by mouth As Needed. Pt does not know dosage, did not bring bottle in.   3/20/2017 at 2100   • aspirin 81 MG chewable tablet Chew 81 mg Daily. HOLD PRIOR TO SURGERY   3/14/2017   • CHLORHEXIDINE GLUCONATE CLOTH EX Apply  topically. AS DIRECTED:  PM DAY BEFORE SURGERY  AM DAY OF SURGERY   3/21/2017 at 0400   • diclofenac (VOLTAREN) 75 MG EC tablet Take 75 mg by mouth 2 (Two) Times a Day. HOLD PRIOR TO SURGERY   3/14/2017   • levocetirizine (XYZAL) 5 MG tablet Take 5 mg by mouth Every Evening.   3/18/2017   • lisinopril (PRINIVIL,ZESTRIL) 40 MG tablet Take 40 mg by mouth Every Night.   3/20/2017 at 2000   • magnesium oxide (MAG-OX) 400 MG tablet Take 400 mg by mouth Daily. HOLD PRIOR TO SURGERY   3/14/2017   • metoprolol succinate XL (TOPROL-XL) 50 MG 24 hr tablet Take 50 mg by mouth Every Morning.   3/21/2017 at 0400   • montelukast (SINGULAIR) 10 MG tablet Take 10 mg by mouth Every Night.   3/18/2017   • mupirocin (BACTROBAN) 2 % nasal ointment into each nostril. AS DIRECTED:  AM & PM DAY BEFORE SURGERY  AM DAY OF SURGERY   3/21/2017 at 0400   • potassium chloride (K-DUR) 10 MEQ CR tablet Take 10 mEq by mouth Daily.    "3/14/2017   • RELION INSULIN SYR 0.5CC/29G 29G X 1/2\" 0.5 ML misc    3/20/2017 at 2000     Allergies:  Amoxicillin and Furadantin [nitrofurantoin]    Review of Systems  Some constipation lately  H/o CAD but stable lately. No CP or SOA  Has some problems with heartburn  No dysuria  Pt was seen the evening of surgery & was quite tired when I saw her so I didn't do an extended ROS    Objective      Vital Signs  Temp:  [97.6 °F (36.4 °C)-98.7 °F (37.1 °C)] 98.4 °F (36.9 °C)  Heart Rate:  [63-87] 87  Resp:  [16-18] 16  BP: ()/(52-71) 129/71  Body mass index is 29.13 kg/(m^2).    Physical Exam  WDWN WF in NAD  Skin warm & dry, appears comfortable  PERRL, EOMI, oropharynx benign  Neck supple without adenopathy or thyromegaly  Lungs clear  Heart RRR  Abd soft, NT, BS+  Ext no edema      Results Review:    I reviewed the patient's new clinical results.      Assessment/Plan     Active Problems:    Primary osteoarthritis of right hip      Assessment & Plan  1.  H/O HTN with postop hypotension. BP down to 95/60 earlier. I have d/c'ed the lisinopril & HCTZ & decreased the metoprolol dose. I expect that this will be better tomorrow & her metoprolol dose can be increased back up. I will continue to hold the lisinopril until it looks certain that she'll not drop too low. She has a h/o CAD & for that reason I want to keep the metoprolol &increase it back to her previous dose as soon as her BP improves.   2. Type 2 DM. I've ordered accuchecks & a sliding scale. For now, I'm going to hold the metformin. Her home dose of levemir is a lot higher than what is currently ordered & her last accucheck was up so I'm going to increase that back up closer to her home dose.  3. H/O CAD      Thank you very much for asking [facility] to be involved in this patient's care.  We will follow along with you.      MD Dante [facility]  03/21/17  9:44 PM    " Sarecycline Counseling: Patient advised regarding possible photosensitivity and discoloration of the teeth, skin, lips, tongue and gums.  Patient instructed to avoid sunlight, if possible.  When exposed to sunlight, patients should wear protective clothing, sunglasses, and sunscreen.  The patient was instructed to call the office immediately if the following severe adverse effects occur:  hearing changes, easy bruising/bleeding, severe headache, or vision changes.  The patient verbalized understanding of the proper use and possible adverse effects of sarecycline.  All of the patient's questions and concerns were addressed.

## (undated) DEVICE — DRSNG BRDR MEPILEX P/OP SIL 4X12IN

## (undated) DEVICE — PK ANT HIP 40

## (undated) DEVICE — SUT VIC 1 CT1 36IN J947H

## (undated) DEVICE — ENCORE® LATEX ORTHO SIZE 8, STERILE LATEX POWDER-FREE SURGICAL GLOVE: Brand: ENCORE

## (undated) DEVICE — ANTIBACTERIAL UNDYED BRAIDED (POLYGLACTIN 910), SYNTHETIC ABSORBABLE SUTURE: Brand: COATED VICRYL

## (undated) DEVICE — STPLR SKIN VISISTAT WD 35CT

## (undated) DEVICE — APPL CHLORAPREP W/TINT 26ML ORNG

## (undated) DEVICE — PREP SOL POVIDONE/IODINE BT 4OZ

## (undated) DEVICE — GLV SURG TRIUMPH CLASSIC PF LTX 8 STRL

## (undated) DEVICE — INTEGUSEAL MICROBIAL SEALANT: Brand: AVANOS

## (undated) DEVICE — MAT FLR ABSORBENT LG 4FT 10 2.5FT

## (undated) DEVICE — 1010 S-DRAPE TOWEL DRAPE 10/BX: Brand: STERI-DRAPE™

## (undated) DEVICE — SUT MNCRYL PLS ANTIB UD 4/0 PS2 18IN

## (undated) DEVICE — BIPOLAR SEALER 23-112-1 AQM 6.0: Brand: AQUAMANTYS™

## (undated) DEVICE — ADHS SKIN DERMABOND TOP ADVANCED

## (undated) DEVICE — TOWEL,OR,DSP,ST,BLUE,STD,4/PK,20PK/CS: Brand: MEDLINE

## (undated) DEVICE — DRSNG BRDR MEPILEX P/OP SIL 4X8IN

## (undated) DEVICE — Device